# Patient Record
Sex: FEMALE | Race: WHITE | NOT HISPANIC OR LATINO | Employment: OTHER | URBAN - METROPOLITAN AREA
[De-identification: names, ages, dates, MRNs, and addresses within clinical notes are randomized per-mention and may not be internally consistent; named-entity substitution may affect disease eponyms.]

---

## 2017-01-23 ENCOUNTER — TELEPHONE (OUTPATIENT)
Dept: MEDICAL GROUP | Facility: CLINIC | Age: 77
End: 2017-01-23

## 2017-01-23 DIAGNOSIS — Z13.89 SKIN CONDITION SCREENING: ICD-10-CM

## 2017-02-01 ENCOUNTER — OFFICE VISIT (OUTPATIENT)
Dept: MEDICAL GROUP | Facility: CLINIC | Age: 77
End: 2017-02-01
Payer: MEDICARE

## 2017-02-01 VITALS
BODY MASS INDEX: 25.19 KG/M2 | RESPIRATION RATE: 18 BRPM | DIASTOLIC BLOOD PRESSURE: 88 MMHG | TEMPERATURE: 98.2 F | SYSTOLIC BLOOD PRESSURE: 128 MMHG | HEIGHT: 61 IN | WEIGHT: 133.4 LBS | HEART RATE: 98 BPM | OXYGEN SATURATION: 97 %

## 2017-02-01 DIAGNOSIS — Z12.31 ENCOUNTER FOR SCREENING MAMMOGRAM FOR MALIGNANT NEOPLASM OF BREAST: ICD-10-CM

## 2017-02-01 DIAGNOSIS — L98.9 SKIN LESION: ICD-10-CM

## 2017-02-01 DIAGNOSIS — Z23 NEED FOR PNEUMOCOCCAL VACCINATION: ICD-10-CM

## 2017-02-01 PROCEDURE — G8420 CALC BMI NORM PARAMETERS: HCPCS | Performed by: PHYSICIAN ASSISTANT

## 2017-02-01 PROCEDURE — 3017F COLORECTAL CA SCREEN DOC REV: CPT | Performed by: PHYSICIAN ASSISTANT

## 2017-02-01 PROCEDURE — 3014F SCREEN MAMMO DOC REV: CPT | Performed by: PHYSICIAN ASSISTANT

## 2017-02-01 PROCEDURE — 90670 PCV13 VACCINE IM: CPT | Performed by: PHYSICIAN ASSISTANT

## 2017-02-01 PROCEDURE — G0009 ADMIN PNEUMOCOCCAL VACCINE: HCPCS | Performed by: PHYSICIAN ASSISTANT

## 2017-02-01 PROCEDURE — 4040F PNEUMOC VAC/ADMIN/RCVD: CPT | Performed by: PHYSICIAN ASSISTANT

## 2017-02-01 PROCEDURE — 99213 OFFICE O/P EST LOW 20 MIN: CPT | Mod: 25 | Performed by: PHYSICIAN ASSISTANT

## 2017-02-01 PROCEDURE — 1101F PT FALLS ASSESS-DOCD LE1/YR: CPT | Performed by: PHYSICIAN ASSISTANT

## 2017-02-01 PROCEDURE — G8482 FLU IMMUNIZE ORDER/ADMIN: HCPCS | Performed by: PHYSICIAN ASSISTANT

## 2017-02-01 PROCEDURE — G8432 DEP SCR NOT DOC, RNG: HCPCS | Performed by: PHYSICIAN ASSISTANT

## 2017-02-01 PROCEDURE — 1036F TOBACCO NON-USER: CPT | Performed by: PHYSICIAN ASSISTANT

## 2017-02-01 NOTE — MR AVS SNAPSHOT
"        Janet Hummel   2017 9:45 AM   Office Visit   MRN: [id number]    Department:  [facility]   Dept Phone:  [phone]    Description:  Female : 1940   Provider:  Dalila Black PA-C           Reason for Visit     Other \" spot popped up near neck last month and wants to have it checked, seems to be changing in appearance\"       Allergies as of 2017     Allergen Noted Reactions    Nkda [No Known Drug Allergy] 2009         You were diagnosed with     Encounter for screening mammogram for malignant neoplasm of breast   [622882]       Skin lesion   [185774]       Need for pneumococcal vaccination   [324832]         Vital Signs     Blood Pressure Pulse Temperature Respirations Height Weight    128/88 mmHg 98 36.8 °C (98.2 °F) 18 1.549 m (5' 1\") 60.51 kg (133 lb 6.4 oz)    Body Mass Index Oxygen Saturation Last Menstrual Period Smoking Status          25.22 kg/m2 97% 1996 Never Smoker         Basic Information     Date Of Birth Sex Race Ethnicity Preferred Language    1940 Female White Non- English      Your appointments     Mar 13, 2017  9:20 AM   Established Patient with Amy Salgado M.D.   [facility]    [address] [id number] [phone]           You will be receiving a confirmation call a few days before your appointment from our automated call confirmation system.            2017 10:40 AM   Established Patient with Amy Salgado M.D.   [facility]    [address] [place] [id number] [phone]           You will be receiving a confirmation call a few days before your appointment from our automated call confirmation system.              Problem List              ICD-10-CM Priority Class Noted - Resolved    Ringing in ears H93.19   2009 - Present    OA (osteoarthritis) M19.90   10/26/2011 - Present    Allergic rhinitis " J30.9   10/26/2011 - Present    Osteopenia M85.80   9/25/2013 - Present    Hyperglycemia R73.9   10/14/2015 - Present      Health Maintenance        Date Due Completion Dates    IMM DTaP/Tdap/Td Vaccine (1 - Tdap) 3/22/1961 ---    IMM ZOSTER VACCINE 3/22/2002 ---    IMM PNEUMOCOCCAL 65+ (ADULT) LOW/MEDIUM RISK SERIES (1 of 2 - PCV13) 3/22/2007 ---    MAMMOGRAM 7/16/2015 7/16/2014, 7/15/2013, 7/11/2012, 6/13/2011, 6/7/2010, 6/7/2010, 6/4/2009, 6/4/2009, 6/3/2008, 6/3/2008, 5/29/2007, 5/29/2007, 4/26/2006    COLONOSCOPY 4/25/2019 4/25/2016    BONE DENSITY 11/12/2019 11/12/2014, 7/11/2012, 7/13/2010, 5/24/2006            Current Immunizations     13-VALENT PCV PREVNAR 2/1/2017 10:10 AM    Influenza Vaccine Adult HD 10/28/2016      Below and/or attached are the medications your provider expects you to take. Review all of your home medications and newly ordered medications with your provider and/or pharmacist. Follow medication instructions as directed by your provider and/or pharmacist. Please keep your medication list with you and share with your provider. Update the information when medications are discontinued, doses are changed, or new medications (including over-the-counter products) are added; and carry medication information at all times in the event of emergency situations     Allergies:  NKDA - (reactions not documented)               Medications  Valid as of: February 01, 2017 - 10:17 AM    Generic Name Brand Name Tablet Size Instructions for use    Glucosamine-Chondroitin   Take 2 Tabs by mouth every morning.        Multiple Vitamins-Minerals   Take  by mouth.        Omega-3 Fatty Acids (Cap) fish oil 1000 MG Take 1,000 mg by mouth 3 times a day, with meals.        .                 Medicines prescribed today were sent to:     [id number] - [place], [place] - [address] [address]    Phone: [phone] Fax: [phone]    Open 24 Hours?: No      Medication refill  instructions:       If your prescription bottle indicates you have medication refills left, it is not necessary to call your provider’s office. Please contact your pharmacy and they will refill your medication.    If your prescription bottle indicates you do not have any refills left, you may request refills at any time through one of the following ways: The online CompareMyFare system (except Urgent Care), by calling your provider’s office, or by asking your pharmacy to contact your provider’s office with a refill request. Medication refills are processed only during regular business hours and may not be available until the next business day. Your provider may request additional information or to have a follow-up visit with you prior to refilling your medication.   *Please Note: Medication refills are assigned a new Rx number when refilled electronically. Your pharmacy may indicate that no refills were authorized even though a new prescription for the same medication is available at the pharmacy. Please request the medicine by name with the pharmacy before contacting your provider for a refill.        Referral     A referral request has been sent to our patient care coordination department. Please allow 3-5 business days for us to process this request and contact you either by phone or mail. If you do not hear from us by the 5th business day, please call us at [phone].           CompareMyFare Status: Patient Declined

## 2017-02-01 NOTE — PROGRESS NOTES
"Chief Complaint   Patient presents with   • Other     \" spot popped up near neck last month and wants to have it checked, seems to be changing in appearance\"        HPI  Janet Hummel is a 76 y.o. female here today for new onset skin lesion over the chest X 1 mons. Pt noticed a raised fleshy color bump over the chest. Is not sure if it is changing in color or growing in size. States that because of the location her cloths rub on it which has made it red and irritated. denies any bleeding or pruritis has a derm referral however her appointment is not until July.   Denies any Hx of skin cancer.        Past medical, surgical, family, and social history is reviewed in Epic chart by me today.   Medications and allergies reviewed and updated in Epic chart by me today.     ROS:   As documented in history of present illness above    Exam:  Blood pressure 128/88, pulse 98, temperature 36.8 °C (98.2 °F), resp. rate 18, height 1.549 m (5' 1\"), weight 60.51 kg (133 lb 6.4 oz), last menstrual period 01/01/1996, SpO2 97 %.  Constitutional: Alert, no distress, plus 3 vital signs  Skin:  Warm, dry, there is a erythematous raised skin lesion over mid upper chest with some scaling on top. No blood vessels or telangiectasia over the lesion.  Respiratory: lungs clear to auscultation, no wheezes, no rhonchi  Cardiovascular: RRR, no murmur,   MS: obvious kyphosis of thoracic spine  Psych: Alert, pleasant, well-groomed, normal affect    A/P:  1. Encounter for screening mammogram for malignant neoplasm of breast    - MA-SCREEN MAMMO W/CAD-BILAT    2. Skin lesion  Lesion looks like it could be either actinic keratosis/ squamous cell carcinoma or an irritated seborrheic keratosis.   - REFERRAL TO DERMATOLOGY to [facility]    3. Need for pneumococcal vaccination  - PNEUMOCOCCAL CONJUGATE VACCINE 13-VALENT    F/u prn       "

## 2017-02-03 ENCOUNTER — TELEPHONE (OUTPATIENT)
Dept: MEDICAL GROUP | Facility: CLINIC | Age: 77
End: 2017-02-03

## 2017-02-03 NOTE — TELEPHONE ENCOUNTER
Pt called stating she called to make her appointment for the dermatology but they said a referral has not been sent but i looked at her chart and its in there.

## 2017-02-19 ENCOUNTER — PATIENT OUTREACH (OUTPATIENT)
Dept: HEALTH INFORMATION MANAGEMENT | Facility: OTHER | Age: 77
End: 2017-02-19

## 2017-02-23 NOTE — PROGRESS NOTES
Attempt #:2     Annual Wellness Visit Scheduling  1. Scheduling Status:Scheduled     Care Gap Scheduling      Health Maintenance Due   Topic Date Due   • IMM DTaP/Tdap/Td Vaccine (1 - Tdap) DECLINED   • IMM ZOSTER VACCINE  Already had, chart updated   • MAMMOGRAM  Already scheduled   • Annual Wellness Visit  SCHEDULED         MyChart Activation: declined

## 2017-03-08 ENCOUNTER — OFFICE VISIT (OUTPATIENT)
Dept: MEDICAL GROUP | Facility: PHYSICIAN GROUP | Age: 77
End: 2017-03-08
Payer: MEDICARE

## 2017-03-08 VITALS
BODY MASS INDEX: 24.29 KG/M2 | HEART RATE: 94 BPM | OXYGEN SATURATION: 94 % | WEIGHT: 132 LBS | DIASTOLIC BLOOD PRESSURE: 62 MMHG | TEMPERATURE: 99.5 F | SYSTOLIC BLOOD PRESSURE: 150 MMHG | HEIGHT: 62 IN

## 2017-03-08 DIAGNOSIS — M85.80 OSTEOPENIA: ICD-10-CM

## 2017-03-08 DIAGNOSIS — Z00.00 MEDICARE ANNUAL WELLNESS VISIT, SUBSEQUENT: ICD-10-CM

## 2017-03-08 DIAGNOSIS — R73.9 HYPERGLYCEMIA: ICD-10-CM

## 2017-03-08 PROCEDURE — G8510 SCR DEP NEG, NO PLAN REQD: HCPCS | Performed by: NURSE PRACTITIONER

## 2017-03-08 PROCEDURE — 1036F TOBACCO NON-USER: CPT | Performed by: NURSE PRACTITIONER

## 2017-03-08 PROCEDURE — G0439 PPPS, SUBSEQ VISIT: HCPCS | Performed by: NURSE PRACTITIONER

## 2017-03-08 RX ORDER — ASCORBIC ACID 500 MG
500 TABLET ORAL DAILY
COMMUNITY

## 2017-03-08 RX ORDER — MAGNESIUM OXIDE 400 MG/1
400 TABLET ORAL DAILY
COMMUNITY

## 2017-03-08 RX ORDER — TURMERIC ROOT EXTRACT 500 MG
TABLET ORAL
COMMUNITY

## 2017-03-08 ASSESSMENT — PATIENT HEALTH QUESTIONNAIRE - PHQ9: CLINICAL INTERPRETATION OF PHQ2 SCORE: 1

## 2017-03-08 NOTE — ASSESSMENT & PLAN NOTE
Chronic condition managed with current medical regimen  Next DEXA 11/2019   Continue with current OTC meds  Followed by Amy Salgado M.D..

## 2017-03-08 NOTE — ASSESSMENT & PLAN NOTE
Chronic condition managed with current medical regimen  Unchanged, cont ringing, does not interfere with hearing  Followed by ENT Dr Bhat.

## 2017-03-08 NOTE — ASSESSMENT & PLAN NOTE
Chronic condition managed with current medical regimen  Stable per review, occ L thumb joint affected   Continue with current meds  Followed by Amy Salgado M.D..

## 2017-03-08 NOTE — ASSESSMENT & PLAN NOTE
Chronic condition managed with current medical regimen  Stable per review, has year round allergies  No meds at this time  Followed by Amy Salgado M.D..

## 2017-03-08 NOTE — PATIENT INSTRUCTIONS
Continue with care through Amy Salgado M.D..  Next Medicare Annual Wellness Visit is due in one year.    Continue care with specialists you are seeing for your chronic problems.    Attached is some preventative information for you to read.          Fall Prevention and Home Safety  Falls cause injuries and can affect all age groups. It is possible to prevent falls.   HOW TO PREVENT FALLS  · Wear shoes with rubber soles that do not have an opening for your toes.   · Keep the inside and outside of your house well lit.   · Use night lights throughout your home.   · Remove clutter from floors.   · Clean up floor spills.   · Remove throw rugs or fasten them to the floor with carpet tape.   · Do not place electrical cords across pathways.   · Put grab bars by your tub, shower, and toilet. Do not use towel bars as grab bars.   · Put handrails on both sides of the stairway. Fix loose handrails.   · Do not climb on stools or stepladders, if possible.   · Do not wax your floors.   · Repair uneven or unsafe sidewalks, walkways, or stairs.   · Keep items you use a lot within reach.   · Be aware of pets.   · Keep emergency numbers next to the telephone.   · Put smoke detectors in your home and near bedrooms.   Ask your doctor what other things you can do to prevent falls.  Document Released: 10/14/2010 Document Revised: 06/18/2013 Document Reviewed: 03/19/2013  ExitCare® Patient Information ©2013 [employer].    Exercise to Stay Healthy      Exercise helps you become and stay healthy.  EXERCISE IDEAS AND TIPS  Choose exercises that:  · You enjoy.   · Fit into your day.   You do not need to exercise really hard to be healthy. You can do exercises at a slow or medium level and stay healthy. You can:  · Stretch before and after working out.   · Try yoga, Pilates, or marli chi.   · Lift weights.   · Walk fast, swim, jog, run, climb stairs, bicycle, dance, or rollerskate.   · Take aerobic classes.   Exercises that burn about 150  calories:  · Running 1 ½ miles in 15 minutes.   · Playing volleyball for 45 to 60 minutes.   · Washing and waxing a car for 45 to 60 minutes.   · Playing touch football for 45 minutes.   · Walking 1 ¾ miles in 35 minutes.   · Pushing a stroller 1 ½ miles in 30 minutes.   · Playing basketball for 30 minutes.   · Raking leaves for 30 minutes.   · Bicycling 5 miles in 30 minutes.   · Walking 2 miles in 30 minutes.   · Dancing for 30 minutes.   · Shoveling snow for 15 minutes.   · Swimming laps for 20 minutes.   · Walking up stairs for 15 minutes.   · Bicycling 4 miles in 15 minutes.   · Gardening for 30 to 45 minutes.   · Jumping rope for 15 minutes.   · Washing windows or floors for 45 to 60 minutes.     One suggestion is to start walking for 10 minutes after each meal.  This will help with digestion and help you to metabolize your meal.       For Weight Loss -   Recommend portion sizes with more fruits/vegetables/high fiber foods.  Stay away from white bread/pastas/white rice/white potatoes.  Increase Fluid intake to 6-8 glasses of water daily.   Eliminate soda's (diet and regular) from your fluid intake.      Document Released: 01/20/2012 Document Revised: 03/11/2013 Document Reviewed: 01/20/2012  ExitCare® Patient Information ©2013 [employer].    Fat and Cholesterol Control Diet  Cholesterol is a wax-like substance. It comes from your liver and is found in certain foods. There is good (HDL) and bad (LDL) cholesterol. Too much cholesterol in your blood can affect your heart. Certain foods can lower or raise your cholesterol. Eat foods that are low in cholesterol.  Saturated and trans fats are bad fats found in foods that will raise your cholesterol. Do not eat foods that are high in saturated and trans fats.  FOODS HIGHER IN SATURATED AND TRANS FATS  · Dairy products, such as whole milk, eggs, cheese, cream, and butter.   · Fatty meats, such as hot dogs, sausage, and salami.   · Fried foods.   · Trans fats which  are found in margarine and pre-made cookies, crackers, and baked goods.   · Tropical oils, such as coconut and palm oils.   Read package labels at the store. Do not buy products that use saturated or trans fats or hydrogenated oils. Find foods labeled:  · Low-fat.   · Low-saturated fat.   · Trans-fat-free.   · Low-cholesterol.   FOODS LOWER IN CHOLESTEROL  ·  Fruit.   · Vegetables.   · Beans, peas, and lentils.   · Fish.   · Lean meat, such as chicken (without skin) or ground turkey.   · Grains, such as barley, rice, couscous, bulgur wheat, and pasta.   · Heart-healthy tub margarine.   PREPARING YOUR FOOD  · Broil, bake, steam, or roast foods. Do not duarte food.   · Use non-stick cooking sprays.   · Use lemon or herbs to flavor food instead of using butter or stick margarine.   · Use nonfat yogurt, salsa, or low-fat dressings for salads.   Document Released: 06/18/2013 Document Reviewed: 06/18/2013  ExitCare® Patient Information ©2013 [employer].    Recommend annual flu vaccine.  Next due in Fall, 2015.  If you decide not to have the flu vaccine, recommend good handwashing and staying out of crowds during flu season.

## 2017-03-08 NOTE — MR AVS SNAPSHOT
"        Janet Hummel   3/8/2017 1:00 PM   Office Visit   MRN: [id number]    Department:  [facility]   Dept Phone:  [phone]    Description:  Female : 1940   Provider:  RONNIE Olsen           Reason for Visit     Annual Exam subsequent      Allergies as of 3/8/2017     Allergen Noted Reactions    Nkda [No Known Drug Allergy] 2009         You were diagnosed with     Medicare annual wellness visit, subsequent   [711235]       Osteopenia   [638613]       Hyperglycemia   [047432]         Vital Signs     Blood Pressure Pulse Temperature Height Weight Body Mass Index    150/62 mmHg 94 37.5 °C (99.5 °F) 1.562 m (5' 1.5\") 59.875 kg (132 lb) 24.54 kg/m2    Oxygen Saturation Last Menstrual Period Smoking Status             94% 1996 Never Smoker          Basic Information     Date Of Birth Sex Race Ethnicity Preferred Language    1940 Female White Non- English      Your appointments     Mar 13, 2017  9:20 AM   Established Patient with Amy Salgado M.D.   [facility])    [address] [address] [phone]           You will be receiving a confirmation call a few days before your appointment from our automated call confirmation system.            Mar 17, 2017  8:40 AM   MERYL FISCHERN10 with RB MG 1   [facility] ([address])    [address]  [facility] [address] [phone]           No deodorant, powder, perfume or lotion under the arm or breast area.            2017 10:40 AM   Established Patient with Amy Salgado M.D.   [facility])    [address] [address] [phone]           You will be receiving a confirmation call a few days before your appointment from our automated call confirmation system.              Problem List              ICD-10-CM Priority Class Noted - Resolved    Ringing in ears " H93.19   5/28/2009 - Present    OA (osteoarthritis) M19.90   10/26/2011 - Present    Allergic rhinitis J30.9   10/26/2011 - Present    Osteopenia M85.80   9/25/2013 - Present    Hyperglycemia R73.9   10/14/2015 - Present      Health Maintenance        Date Due Completion Dates    IMM DTaP/Tdap/Td Vaccine (1 - Tdap) 3/22/1961 ---    MAMMOGRAM 7/16/2015 7/16/2014, 7/15/2013, 7/11/2012, 6/13/2011, 6/7/2010, 6/7/2010, 6/4/2009, 6/4/2009, 6/3/2008, 6/3/2008, 5/29/2007, 5/29/2007, 4/26/2006    IMM PNEUMOCOCCAL 65+ (ADULT) LOW/MEDIUM RISK SERIES (2 of 2 - PPSV23) 2/1/2018 2/1/2017    COLONOSCOPY 4/25/2019 4/25/2016    BONE DENSITY 11/12/2019 11/12/2014, 7/11/2012, 7/13/2010, 5/24/2006            Current Immunizations     13-VALENT PCV PREVNAR 2/1/2017 10:10 AM    Influenza Vaccine Adult HD 10/28/2016    SHINGLES VACCINE 11/5/2014      Below and/or attached are the medications your provider expects you to take. Review all of your home medications and newly ordered medications with your provider and/or pharmacist. Follow medication instructions as directed by your provider and/or pharmacist. Please keep your medication list with you and share with your provider. Update the information when medications are discontinued, doses are changed, or new medications (including over-the-counter products) are added; and carry medication information at all times in the event of emergency situations     Allergies:  NKDA - (reactions not documented)               Medications  Valid as of: March 08, 2017 -  3:21 PM    Generic Name Brand Name Tablet Size Instructions for use    Ascorbic Acid (Tab) ascorbic acid 500 MG Take 500 mg by mouth every day.        B Complex Vitamins   Take  by mouth.        Cholecalciferol (Tab) Cholecalciferol 66678 UNITS Take  by mouth.        Glucosamine-Chondroitin   Take 2 Tabs by mouth every morning.        Magnesium Oxide (Tab) MAG- MG Take 400 mg by mouth every day.        Menaquinone-7   Take  by  mouth.        Omega-3 Fatty Acids (Cap) fish oil 1000 MG Take 1,000 mg by mouth 3 times a day, with meals.        Turmeric (Tab) Turmeric 500 MG Take  by mouth.        .                 Medicines prescribed today were sent to:     [id number], [place] - [address] [address]    Phone: [phone] Fax: [phone]    Open 24 Hours?: No      Medication refill instructions:       If your prescription bottle indicates you have medication refills left, it is not necessary to call your provider’s office. Please contact your pharmacy and they will refill your medication.    If your prescription bottle indicates you do not have any refills left, you may request refills at any time through one of the following ways: The online [employer] system (except Urgent Care), by calling your provider’s office, or by asking your pharmacy to contact your provider’s office with a refill request. Medication refills are processed only during regular business hours and may not be available until the next business day. Your provider may request additional information or to have a follow-up visit with you prior to refilling your medication.   *Please Note: Medication refills are assigned a new Rx number when refilled electronically. Your pharmacy may indicate that no refills were authorized even though a new prescription for the same medication is available at the pharmacy. Please request the medicine by name with the pharmacy before contacting your provider for a refill.        Instructions    Continue with care through Amy Salgado M.D..  Next Medicare Annual Wellness Visit is due in one year.    Continue care with specialists you are seeing for your chronic problems.    Attached is some preventative information for you to read.          Fall Prevention and Home Safety  Falls cause injuries and can affect all age groups. It is possible to prevent falls.   HOW TO PREVENT FALLS  · Wear  shoes with rubber soles that do not have an opening for your toes.   · Keep the inside and outside of your house well lit.   · Use night lights throughout your home.   · Remove clutter from floors.   · Clean up floor spills.   · Remove throw rugs or fasten them to the floor with carpet tape.   · Do not place electrical cords across pathways.   · Put grab bars by your tub, shower, and toilet. Do not use towel bars as grab bars.   · Put handrails on both sides of the stairway. Fix loose handrails.   · Do not climb on stools or stepladders, if possible.   · Do not wax your floors.   · Repair uneven or unsafe sidewalks, walkways, or stairs.   · Keep items you use a lot within reach.   · Be aware of pets.   · Keep emergency numbers next to the telephone.   · Put smoke detectors in your home and near bedrooms.   Ask your doctor what other things you can do to prevent falls.  Document Released: 10/14/2010 Document Revised: 06/18/2013 Document Reviewed: 03/19/2013  ExitCare® Patient Information ©2013 [employer].    Exercise to Stay Healthy      Exercise helps you become and stay healthy.  EXERCISE IDEAS AND TIPS  Choose exercises that:  · You enjoy.   · Fit into your day.   You do not need to exercise really hard to be healthy. You can do exercises at a slow or medium level and stay healthy. You can:  · Stretch before and after working out.   · Try yoga, Pilates, or marli chi.   · Lift weights.   · Walk fast, swim, jog, run, climb stairs, bicycle, dance, or rollerskate.   · Take aerobic classes.   Exercises that burn about 150 calories:  · Running 1 ½ miles in 15 minutes.   · Playing volleyball for 45 to 60 minutes.   · Washing and waxing a car for 45 to 60 minutes.   · Playing touch football for 45 minutes.   · Walking 1 ¾ miles in 35 minutes.   · Pushing a stroller 1 ½ miles in 30 minutes.   · Playing basketball for 30 minutes.   · Raking leaves for 30 minutes.   · Bicycling 5 miles in 30 minutes.   · Walking 2 miles in  30 minutes.   · Dancing for 30 minutes.   · Shoveling snow for 15 minutes.   · Swimming laps for 20 minutes.   · Walking up stairs for 15 minutes.   · Bicycling 4 miles in 15 minutes.   · Gardening for 30 to 45 minutes.   · Jumping rope for 15 minutes.   · Washing windows or floors for 45 to 60 minutes.     One suggestion is to start walking for 10 minutes after each meal.  This will help with digestion and help you to metabolize your meal.       For Weight Loss -   Recommend portion sizes with more fruits/vegetables/high fiber foods.  Stay away from white bread/pastas/white rice/white potatoes.  Increase Fluid intake to 6-8 glasses of water daily.   Eliminate soda's (diet and regular) from your fluid intake.      Document Released: 01/20/2012 Document Revised: 03/11/2013 Document Reviewed: 01/20/2012  ExitCare® Patient Information ©2013 [employer].    Fat and Cholesterol Control Diet  Cholesterol is a wax-like substance. It comes from your liver and is found in certain foods. There is good (HDL) and bad (LDL) cholesterol. Too much cholesterol in your blood can affect your heart. Certain foods can lower or raise your cholesterol. Eat foods that are low in cholesterol.  Saturated and trans fats are bad fats found in foods that will raise your cholesterol. Do not eat foods that are high in saturated and trans fats.  FOODS HIGHER IN SATURATED AND TRANS FATS  · Dairy products, such as whole milk, eggs, cheese, cream, and butter.   · Fatty meats, such as hot dogs, sausage, and salami.   · Fried foods.   · Trans fats which are found in margarine and pre-made cookies, crackers, and baked goods.   · Tropical oils, such as coconut and palm oils.   Read package labels at the store. Do not buy products that use saturated or trans fats or hydrogenated oils. Find foods labeled:  · Low-fat.   · Low-saturated fat.   · Trans-fat-free.   · Low-cholesterol.   FOODS LOWER IN CHOLESTEROL  ·  Fruit.   · Vegetables.   · Beans, peas,  and lentils.   · Fish.   · Lean meat, such as chicken (without skin) or ground turkey.   · Grains, such as barley, rice, couscous, bulgur wheat, and pasta.   · Heart-healthy tub margarine.   PREPARING YOUR FOOD  · Broil, bake, steam, or roast foods. Do not duarte food.   · Use non-stick cooking sprays.   · Use lemon or herbs to flavor food instead of using butter or stick margarine.   · Use nonfat yogurt, salsa, or low-fat dressings for salads.   Document Released: 06/18/2013 Document Reviewed: 06/18/2013  ExitCare® Patient Information ©2013 [employer].    Recommend annual flu vaccine.  Next due in Fall, 2015.  If you decide not to have the flu vaccine, recommend good handwashing and staying out of crowds during flu season.                  MyChart Status: Patient Declined

## 2017-03-08 NOTE — PROGRESS NOTES
CC:   Medicare Annual Wellness Visit    HPI:  Janet is a 76 y.o. here for Medicare Annual Wellness Visit    Patient Active Problem List    Diagnosis Date Noted   • Hyperglycemia 10/14/2015   • Osteopenia 09/25/2013   • OA (osteoarthritis) 10/26/2011   • Allergic rhinitis 10/26/2011   • Ringing in ears 05/28/2009     Current Outpatient Prescriptions   Medication Sig Dispense Refill   • ascorbic acid (ASCORBIC ACID) 500 MG Tab Take 500 mg by mouth every day.     • magnesium oxide (MAG-OX) 400 MG Tab Take 400 mg by mouth every day.     • B Complex Vitamins (B COMPLEX PO) Take  by mouth.     • Cholecalciferol 13912 UNITS Tab Take  by mouth.     • Menaquinone-7 (VITAMIN K2 PO) Take  by mouth.     • Turmeric 500 MG Tab Take  by mouth.     • Omega-3 Fatty Acids (FISH OIL) 1000 MG CAPS capsule Take 1,000 mg by mouth 3 times a day, with meals.     • GLUCOSAMINE CHONDRO COMPLEX PO Take 2 Tabs by mouth every morning.       No current facility-administered medications for this visit.      Current supplements: no  Chronic narcotic pain medicines: no  Allergies: Nkda  Exercise: as quinn  Current social contact/activities: Has support of family and friends      Screening:  Depression Screening    Little interest or pleasure in doing things?     Feeling down, depressed , or hopeless?    Trouble falling or staying asleep, or sleeping too much?     Feeling tired or having little energy?     Poor appetite or overeating?     Feeling bad about yourself - or that you are a failure or have let yourself or your family down?    Trouble concentrating on things, such as reading the newspaper or watching television?    Moving or speaking so slowly that other people could have noticed.  Or the opposite - being so fidgety or restless that you have been moving around a lot more than usual?     Thoughts that you would be better off dead, or of hurting yourself?     Patient Health Questionnaire Score:      If depressive symptoms identified  deferred to follow up visit unless specifically addressed in assesment and plan.      Screening for Cognitive Impairment    Three Minute Recall (banana, sunrise, fence)   /3    Draw clock face with all 12 numbers set to the hand to show 10 minures past 11 o'clock       Cognitive concerns identified defferred for follow up unless specifically addressed in assesment and plan.    Fall Risk Assessment    Has the patient had two or more falls in the last year or any fall with injury in the last year?       Safety Assessment    Throw rugs on floor.     Handrails on all stairs.     Good lighting in all hallways.     Difficulty hearing.     Patient counseled about all safety risks that were identified.    Functional Assessment ADLs    Are there any barriers preventing you from cooking for yourself or meeting nutritional needs?   .    Are there any barriers preventing you from driving safely or obtaining transportation?   .    Are there any barriers preventing you from using a telephone or calling for help?   .    Are there any barriers preventing you from shopping?   .    Are there any barriers preventing you from taking care of your own finances?   .    Are there any barriers preventing you from managing your medications?   .    Are currently engaing any exercise or physical activity?   .       Health Maintenance Summary                IMM DTaP/Tdap/Td Vaccine Overdue 3/22/1961     MAMMOGRAM Overdue 7/16/2015      Done 7/16/2014 MA-SCREENING MAMMOGRAM W/ CAD     Patient has more history with this topic...    Annual Wellness Visit Overdue 7/23/2016      Done 7/23/2015      Patient has more history with this topic...    IMM PNEUMOCOCCAL 65+ (ADULT) LOW/MEDIUM RISK SERIES Next Due 2/1/2018      Done 2/1/2017 Imm Admin: Pneumococcal Conjugate Vaccine (Prevnar/PCV-13)    COLONOSCOPY Next Due 4/25/2019      Done 4/25/2016 AMB REFERRAL TO GI FOR COLONOSCOPY    BONE DENSITY Next Due 11/12/2019      Done 11/12/2014 DS-BONE DENSITY  "STUDY (DEXA)     Patient has more history with this topic...          Patient Care Team:  Amy Salgado M.D. as PCP - General  Bipin Menjivar M.D. as Consulting Physician (Ophthalmology)  Yohan Pandey D.C. as Consulting Physician (Chiropractic)  Matt Winslow M.D. as Consulting Physician (Allergy)  Brant Fernandez M.D. as Consulting Physician (Orthopaedics)  Tani Solorzano M.D. as Consulting Physician (Gastroenterology)  Yohan Sims M.D. as Consulting Physician (Otolaryngology)  César Mancini D.P.M. as Consulting Physician (Podiatry)        Social History   Substance Use Topics   • Smoking status: Never Smoker    • Smokeless tobacco: Never Used   • Alcohol Use: No       Family History   Problem Relation Age of Onset   • Thyroid Mother      She  has a past medical history of Allergy; Osteoporosis; Arthritis; CATARACT; and Right shoulder pain (8/12/2013).   Past Surgical History   Procedure Laterality Date   • Tonsillectomy     • Cataract extraction with iol     • Colonoscopy     • Umbilical hernia repair  9/17/2009     Performed by JUDITH LOZA at SURGERY [facility]   • Low anterior resection laparoscopic  9/4/2012     Performed by TANI SRINIVASAN at SURGERY [facility]S       ROS:    Ostomy or other tubes or amputations: no  Chronic oxygen use no  Last eye exam 2016  : Denies incontinence.   Gait: Uses no assistive device   Hearing good.    Dentition good    Exam: Blood pressure 150/62, pulse 94, temperature 37.5 °C (99.5 °F), height 1.562 m (5' 1.5\"), weight 59.875 kg (132 lb), last menstrual period 01/01/1996, SpO2 94 %. Body mass index is 24.54 kg/(m^2).  Alert, oriented in no acute distress.  Eye contact is good, speech goal directed, affect calm      Assessment and Plan. The following treatment and monitoring plan is recommended for all problems as listed below:   OA (osteoarthritis)  Chronic condition managed with current medical regimen  Stable per review, occ L thumb " joint affected   Continue with current meds  Followed by Amy Salgado M.D..        Allergic rhinitis  Chronic condition managed with current medical regimen  Stable per review, has year round allergies  No meds at this time  Followed by Amy Salgado M.D..        Osteopenia  Chronic condition managed with current medical regimen  Next DEXA 11/2019   Continue with current OTC meds  Followed by Amy Salgado M.D..        Hyperglycemia  Last A1C in chart 10/2015 6.4  Diet managed  Followed by Amy Salgado M.D..      Ringing in Ears  Chronic condition managed with current medical regimen  Unchanged, cont ringing, does not interfere with hearing  Followed by ENT Dr Bhat.            Health Care Screening recommendations reviewed with patient today: per Patient Instructions  DPA/Advanced directive: .has an advanced directive - a copy has been provided    Next office visit for recheck of chronic medical conditions is due with Amy Salgado M.D. 3/13/2017        Has an appt for mammo 3/17/2017

## 2017-03-13 ENCOUNTER — OFFICE VISIT (OUTPATIENT)
Dept: MEDICAL GROUP | Facility: CLINIC | Age: 77
End: 2017-03-13
Payer: MEDICARE

## 2017-03-13 VITALS
HEART RATE: 78 BPM | TEMPERATURE: 98.4 F | HEIGHT: 61 IN | RESPIRATION RATE: 16 BRPM | WEIGHT: 129 LBS | DIASTOLIC BLOOD PRESSURE: 70 MMHG | OXYGEN SATURATION: 97 % | BODY MASS INDEX: 24.35 KG/M2 | SYSTOLIC BLOOD PRESSURE: 130 MMHG

## 2017-03-13 DIAGNOSIS — R73.9 HYPERGLYCEMIA: ICD-10-CM

## 2017-03-13 DIAGNOSIS — M85.80 OSTEOPENIA: ICD-10-CM

## 2017-03-13 DIAGNOSIS — M15.9 OSTEOARTHRITIS OF MULTIPLE JOINTS, UNSPECIFIED OSTEOARTHRITIS TYPE: ICD-10-CM

## 2017-03-13 DIAGNOSIS — J30.1 SEASONAL ALLERGIC RHINITIS DUE TO POLLEN: ICD-10-CM

## 2017-03-13 PROCEDURE — 99213 OFFICE O/P EST LOW 20 MIN: CPT | Performed by: INTERNAL MEDICINE

## 2017-03-13 PROCEDURE — G8420 CALC BMI NORM PARAMETERS: HCPCS | Performed by: INTERNAL MEDICINE

## 2017-03-13 PROCEDURE — G8482 FLU IMMUNIZE ORDER/ADMIN: HCPCS | Performed by: INTERNAL MEDICINE

## 2017-03-13 PROCEDURE — 3017F COLORECTAL CA SCREEN DOC REV: CPT | Performed by: INTERNAL MEDICINE

## 2017-03-13 PROCEDURE — 3014F SCREEN MAMMO DOC REV: CPT | Performed by: INTERNAL MEDICINE

## 2017-03-13 PROCEDURE — 1036F TOBACCO NON-USER: CPT | Performed by: INTERNAL MEDICINE

## 2017-03-13 PROCEDURE — 1101F PT FALLS ASSESS-DOCD LE1/YR: CPT | Performed by: INTERNAL MEDICINE

## 2017-03-13 PROCEDURE — G8432 DEP SCR NOT DOC, RNG: HCPCS | Performed by: INTERNAL MEDICINE

## 2017-03-13 PROCEDURE — 4040F PNEUMOC VAC/ADMIN/RCVD: CPT | Performed by: INTERNAL MEDICINE

## 2017-03-13 NOTE — PROGRESS NOTES
"Subjective:  Janet is a 76 y.o. female with the following   Past Medical History   Diagnosis Date   • Allergy    • Osteoporosis    • Arthritis      thumb   • CATARACT      IOL beatris   • Right shoulder pain 8/12/2013      Family History   Problem Relation Age of Onset   • Thyroid Mother      The patient is on the following medications:   Current outpatient prescriptions:   •  Cholecalciferol (VITAMIN D3) 5000 UNITS Cap, Take 1 Cap by mouth every day., Disp: , Rfl:   •  ascorbic acid (ASCORBIC ACID) 500 MG Tab, Take 500 mg by mouth every day., Disp: , Rfl:   •  magnesium oxide (MAG-OX) 400 MG Tab, Take 400 mg by mouth every day., Disp: , Rfl:   •  B Complex Vitamins (B COMPLEX PO), Take  by mouth., Disp: , Rfl:   •  Menaquinone-7 (VITAMIN K2 PO), Take  by mouth., Disp: , Rfl:   •  Turmeric 500 MG Tab, Take  by mouth., Disp: , Rfl:   •  Omega-3 Fatty Acids (FISH OIL) 1000 MG CAPS capsule, Take 1,000 mg by mouth 3 times a day, with meals., Disp: , Rfl:   •  GLUCOSAMINE CHONDRO COMPLEX PO, Take 2 Tabs by mouth every morning., Disp: , Rfl:     HPI; patient is here today for follow-up visit, currently on vitamin D3 supplements for history of osteopenia with vitamin D deficiency, on over-the-counter glucosamine-chondroitin and Tylenol as needed for osteoarthritis denies having joint swelling or pain, abdominal pain or jaundice. Patient has had history of hyperglycemia denies having polyuria or polydipsia.  ROS:  See HPI    Blood pressure 130/70, pulse 78, temperature 36.9 °C (98.4 °F), resp. rate 16, height 1.562 m (5' 1.5\"), weight 58.514 kg (129 lb), last menstrual period 01/01/1996, SpO2 97 %.on RA  Objective:  Patient is well appearing and in no acute distress.  Pharynx is clear.  Neck is soft and supple with no cervical or supraclavicular lymphadenopathy, thyromegaly or masses, no JVD.  Lungs clear to auscultation bilaterally with normal respiratory effort. Abdomen soft, non-tender on palpation,not distended. Heart " regular rate and rhythm without murmur. Extremities without any clubbing, cyanosis, or edema.    Assessment and Plan:  1. osteopenia ; currently  on vitamin D  supplements, magnesium, ....       2. Hyperglycemia. Currently is asymptomatic, labs are pending      3. allergic rhinitis; responds to OTC nasal sprays as needed       4. Osteoarthritis; currently on OTC glucosamine -chondroitin, Tylenol as needed.       Patient is advised regarding preventive and supportive care, diet and lifestyle modification, daily walking ,exercise and weight loss.    Please note that this dictation was created using voice recognition software. I have worked with consultants from the vendor as well as technical experts from [employer] to optimize the interface. I have made every reasonable attempt to correct obvious errors, but I expect that there are errors of grammar and possibly content that I did not discover before finalizing the note.Please note that this dictation was created using voice recognition software. I have worked with consultants from the vendor as well as technical experts from [employer] to optimize the interface. I have made every reasonable attempt to correct obvious errors, but I expect that there are errors of grammar and possibly content that I did not discover before finalizing the note.

## 2017-03-13 NOTE — MR AVS SNAPSHOT
"Janet Hummel   3/13/2017 9:20 AM   Office Visit   MRN: [id number]    Department:  [facility]   Dept Phone:  [phone]    Description:  Female : 1940   Provider:  Amy Salgado M.D.           Allergies as of 3/13/2017     Allergen Noted Reactions    Nkda [No Known Drug Allergy] 2009         You were diagnosed with     Osteopenia   [206609]       Hyperglycemia   [375208]         Vital Signs     Blood Pressure Pulse Temperature Respirations Height Weight    130/70 mmHg 78 36.9 °C (98.4 °F) 16 1.562 m (5' 1.5\") 58.514 kg (129 lb)    Body Mass Index Oxygen Saturation Last Menstrual Period Smoking Status          23.98 kg/m2 97% 1996 Never Smoker         Basic Information     Date Of Birth Sex Race Ethnicity Preferred Language    1940 Female White Non- English      Your appointments     Mar 17, 2017  8:40 AM   MA SCRN10 with [facility] MG 1   [facility] BREAST [facility] ([address])    [address] [place] [id number]   [phone]           No deodorant, powder, perfume or lotion under the arm or breast area.              Problem List              ICD-10-CM Priority Class Noted - Resolved    Ringing in ears H93.19   2009 - Present    OA (osteoarthritis) M19.90   10/26/2011 - Present    Allergic rhinitis J30.9   10/26/2011 - Present    Osteopenia M85.80   2013 - Present    Hyperglycemia R73.9   10/14/2015 - Present      Health Maintenance        Date Due Completion Dates    IMM DTaP/Tdap/Td Vaccine (1 - Tdap) 3/22/1961 ---    MAMMOGRAM 2015, 7/15/2013, 2012, 2011, 2010, 2010, 2009, 2009, 6/3/2008, 6/3/2008, 2007, 2007, 2006    IMM PNEUMOCOCCAL 65+ (ADULT) LOW/MEDIUM RISK SERIES (2 of 2 - PPSV23) 2018    COLONOSCOPY 2019    BONE DENSITY 2019, 2012, 2010, 2006            Current Immunizations     13-VALENT PCV PREVNAR " 2/1/2017 10:10 AM    Influenza Vaccine Adult HD 10/28/2016    SHINGLES VACCINE 11/5/2014      Below and/or attached are the medications your provider expects you to take. Review all of your home medications and newly ordered medications with your provider and/or pharmacist. Follow medication instructions as directed by your provider and/or pharmacist. Please keep your medication list with you and share with your provider. Update the information when medications are discontinued, doses are changed, or new medications (including over-the-counter products) are added; and carry medication information at all times in the event of emergency situations     Allergies:  NKDA - (reactions not documented)               Medications  Valid as of: March 13, 2017 -  9:37 AM    Generic Name Brand Name Tablet Size Instructions for use    Ascorbic Acid (Tab) ascorbic acid 500 MG Take 500 mg by mouth every day.        B Complex Vitamins   Take  by mouth.        Cholecalciferol (Cap) vitamin D3 5000 UNITS Take 1 Cap by mouth every day.        Glucosamine-Chondroitin   Take 2 Tabs by mouth every morning.        Magnesium Oxide (Tab) MAG- MG Take 400 mg by mouth every day.        Menaquinone-7   Take  by mouth.        Omega-3 Fatty Acids (Cap) fish oil 1000 MG Take 1,000 mg by mouth 3 times a day, with meals.        Turmeric (Tab) Turmeric 500 MG Take  by mouth.        .                 Medicines prescribed today were sent to:     [facility] # [address] [place] [address]    Phone: [phone] Fax: [phone]    Open 24 Hours?: No      Medication refill instructions:       If your prescription bottle indicates you have medication refills left, it is not necessary to call your provider’s office. Please contact your pharmacy and they will refill your medication.    If your prescription bottle indicates you do not have any refills left, you may request refills at any time  through one of the following ways: The online UseTogether system (except Urgent Care), by calling your provider’s office, or by asking your pharmacy to contact your provider’s office with a refill request. Medication refills are processed only during regular business hours and may not be available until the next business day. Your provider may request additional information or to have a follow-up visit with you prior to refilling your medication.   *Please Note: Medication refills are assigned a new Rx number when refilled electronically. Your pharmacy may indicate that no refills were authorized even though a new prescription for the same medication is available at the pharmacy. Please request the medicine by name with the pharmacy before contacting your provider for a refill.        Your To Do List     Future Labs/Procedures Complete By Expires    CREATININE  As directed 3/13/2018    HEMOGLOBIN A1C  As directed 3/13/2018    VITAMIN D,25 HYDROXY  As directed 3/13/2018         UseTogether Status: Patient Declined

## 2017-03-17 ENCOUNTER — TELEPHONE (OUTPATIENT)
Dept: MEDICAL GROUP | Facility: CLINIC | Age: 77
End: 2017-03-17

## 2017-03-17 ENCOUNTER — HOSPITAL ENCOUNTER (OUTPATIENT)
Dept: RADIOLOGY | Facility: MEDICAL CENTER | Age: 77
End: 2017-03-17
Attending: PHYSICIAN ASSISTANT
Payer: MEDICARE

## 2017-03-17 PROCEDURE — 77063 BREAST TOMOSYNTHESIS BI: CPT

## 2017-03-17 NOTE — TELEPHONE ENCOUNTER
----- Message from Dalila Black PA-C sent at 3/17/2017  2:18 PM PDT -----  Please inform pt:    Mammogram result was normal, follow up mammogram is recommended in one year.      Thank you,  Dalila Black PA-C

## 2017-06-09 ENCOUNTER — HOSPITAL ENCOUNTER (OUTPATIENT)
Dept: LAB | Facility: MEDICAL CENTER | Age: 77
End: 2017-06-09
Attending: INTERNAL MEDICINE
Payer: MEDICARE

## 2017-06-09 DIAGNOSIS — M85.80 OSTEOPENIA: ICD-10-CM

## 2017-06-09 DIAGNOSIS — R73.9 HYPERGLYCEMIA: ICD-10-CM

## 2017-06-09 LAB
25(OH)D3 SERPL-MCNC: 65 NG/ML (ref 30–100)
CREAT SERPL-MCNC: 0.74 MG/DL (ref 0.5–1.4)
EST. AVERAGE GLUCOSE BLD GHB EST-MCNC: 128 MG/DL
GFR SERPL CREATININE-BSD FRML MDRD: >60 ML/MIN/1.73 M 2
HBA1C MFR BLD: 6.1 % (ref 0–5.6)

## 2017-06-09 PROCEDURE — 83036 HEMOGLOBIN GLYCOSYLATED A1C: CPT | Mod: GA

## 2017-06-09 PROCEDURE — 82565 ASSAY OF CREATININE: CPT

## 2017-06-09 PROCEDURE — 82306 VITAMIN D 25 HYDROXY: CPT

## 2017-06-09 PROCEDURE — 36415 COLL VENOUS BLD VENIPUNCTURE: CPT

## 2017-07-05 ENCOUNTER — TELEPHONE (OUTPATIENT)
Dept: MEDICAL GROUP | Facility: PHYSICIAN GROUP | Age: 77
End: 2017-07-05

## 2017-07-17 ENCOUNTER — OFFICE VISIT (OUTPATIENT)
Dept: MEDICAL GROUP | Facility: PHYSICIAN GROUP | Age: 77
End: 2017-07-17
Payer: MEDICARE

## 2017-07-17 VITALS
HEART RATE: 101 BPM | RESPIRATION RATE: 18 BRPM | BODY MASS INDEX: 23.98 KG/M2 | DIASTOLIC BLOOD PRESSURE: 66 MMHG | SYSTOLIC BLOOD PRESSURE: 140 MMHG | TEMPERATURE: 98.8 F | HEIGHT: 61 IN | WEIGHT: 127 LBS | OXYGEN SATURATION: 95 %

## 2017-07-17 DIAGNOSIS — J30.1 SEASONAL ALLERGIC RHINITIS DUE TO POLLEN: ICD-10-CM

## 2017-07-17 DIAGNOSIS — R73.9 HYPERGLYCEMIA: ICD-10-CM

## 2017-07-17 DIAGNOSIS — M15.9 OSTEOARTHRITIS OF MULTIPLE JOINTS, UNSPECIFIED OSTEOARTHRITIS TYPE: ICD-10-CM

## 2017-07-17 DIAGNOSIS — M85.89 OSTEOPENIA OF MULTIPLE SITES: ICD-10-CM

## 2017-07-17 PROCEDURE — 99213 OFFICE O/P EST LOW 20 MIN: CPT | Performed by: INTERNAL MEDICINE

## 2017-07-17 NOTE — MR AVS SNAPSHOT
"        Janet Hummel   2017 2:00 PM   Office Visit   MRN: [id number]    Department:  [facility]   Dept Phone:  [phone]    Description:  Female : 1940   Provider:  Amy Salgado M.D.           Reason for Visit     Follow-Up lab results      Allergies as of 2017     Allergen Noted Reactions    Nkda [No Known Drug Allergy] 2009         You were diagnosed with     Osteopenia of multiple sites   [9853196]       Hyperglycemia   [829643]       Osteoarthritis of multiple joints, unspecified osteoarthritis type   [1880897]       Seasonal allergic rhinitis due to pollen   [7023571]         Vital Signs     Blood Pressure Pulse Temperature Respirations Height Weight    140/66 mmHg 101 37.1 °C (98.8 °F) 18 1.562 m (5' 1.5\") 57.607 kg (127 lb)    Body Mass Index Oxygen Saturation Last Menstrual Period Smoking Status          23.61 kg/m2 95% 1996 Never Smoker         Basic Information     Date Of Birth Sex Race Ethnicity Preferred Language    1940 Female White Non- English      Problem List              ICD-10-CM Priority Class Noted - Resolved    Ringing in ears H93.19   2009 - Present    OA (osteoarthritis) M19.90   10/26/2011 - Present    Allergic rhinitis J30.9   10/26/2011 - Present    Osteopenia M85.80   2013 - Present    Hyperglycemia R73.9   10/14/2015 - Present      Health Maintenance        Date Due Completion Dates    IMM DTaP/Tdap/Td Vaccine (1 - Tdap) 3/22/1961 ---    IMM INFLUENZA (1) 2017 10/28/2016    IMM PNEUMOCOCCAL 65+ (ADULT) LOW/MEDIUM RISK SERIES (2 of 2 - PPSV23) 2018    MAMMOGRAM 3/17/2018 3/17/2017, 2014, 7/15/2013, 2012, 2011, 2010, 2010, 2009, 2009, 6/3/2008, 6/3/2008, 2007, 2007, 2006    COLONOSCOPY 2019    BONE DENSITY 2019, 2012, 2010, 2006            Current Immunizations     13-VALENT PCV PREVNAR 2017 10:10 AM   " Influenza Vaccine Adult HD 10/28/2016    SHINGLES VACCINE 11/5/2014      Below and/or attached are the medications your provider expects you to take. Review all of your home medications and newly ordered medications with your provider and/or pharmacist. Follow medication instructions as directed by your provider and/or pharmacist. Please keep your medication list with you and share with your provider. Update the information when medications are discontinued, doses are changed, or new medications (including over-the-counter products) are added; and carry medication information at all times in the event of emergency situations     Allergies:  NKDA - (reactions not documented)               Medications  Valid as of: July 17, 2017 -  3:14 PM    Generic Name Brand Name Tablet Size Instructions for use    Ascorbic Acid (Tab) ascorbic acid 500 MG Take 500 mg by mouth every day.        B Complex Vitamins   Take  by mouth.        Cholecalciferol (Cap) vitamin D3 5000 UNITS Take 1 Cap by mouth every day.        Glucosamine-Chondroitin   Take 2 Tabs by mouth every morning.        Magnesium Oxide (Tab) MAG- MG Take 400 mg by mouth every day.        Menaquinone-7   Take  by mouth.        Omega-3 Fatty Acids (Cap) fish oil 1000 MG Take 1,000 mg by mouth 3 times a day, with meals.        Turmeric (Tab) Turmeric 500 MG Take  by mouth.        .                 Medicines prescribed today were sent to:     [facility] # [address] [place] [address] [address] [place] [address]    Phone: [phone] Fax: [phone]    Open 24 Hours?: No      Medication refill instructions:       If your prescription bottle indicates you have medication refills left, it is not necessary to call your provider’s office. Please contact your pharmacy and they will refill your medication.    If your prescription bottle indicates you do not have any refills left, you may request refills at any time through one of the  following ways: The online 1010data system (except Urgent Care), by calling your provider’s office, or by asking your pharmacy to contact your provider’s office with a refill request. Medication refills are processed only during regular business hours and may not be available until the next business day. Your provider may request additional information or to have a follow-up visit with you prior to refilling your medication.   *Please Note: Medication refills are assigned a new Rx number when refilled electronically. Your pharmacy may indicate that no refills were authorized even though a new prescription for the same medication is available at the pharmacy. Please request the medicine by name with the pharmacy before contacting your provider for a refill.        Your To Do List     Future Labs/Procedures Complete By Expires    HBA1C  1/13/2018 7/17/2018    VITAMIN D,25 HYDROXY  As directed 7/17/2018      Instructions    Patient is instructed regarding acute on chronic issues.          1010data Access Code: [email or web address]  Expires: 8/16/2017  3:14 PM    1010data  A secure, online tool to manage your health information     [employer]’s 1010data® is a secure, online tool that connects you to your personalized health information from the privacy of your home -- day or night - making it very easy for you to manage your healthcare. Once the activation process is completed, you can even access your medical information using the 1010data leesa, which is available for free in the Apple Leesa store or Google Play store.     1010data provides the following levels of access (as shown below):   My Chart Features   [facility] Doctor [facility]  Specialists [facility]  Urgent  Care Non-[facility]  Primary Care  Doctor   Email your healthcare team securely and privately 24/7 X X X    Manage appointments: schedule your next appointment; view details of past/upcoming appointments X      Request prescription refills. X      View recent  personal medical records, including lab and immunizations X X X X   View health record, including health history, allergies, medications X X X X   Read reports about your outpatient visits, procedures, consult and ER notes X X X X   See your discharge summary, which is a recap of your hospital and/or ER visit that includes your diagnosis, lab results, and care plan. X X       How to register for Eday[employer]:  1. Go to  [email or web address].  2. Click on the Sign Up Now box, which takes you to the New Member Sign Up page. You will need to provide the following information:  a. Enter your Edaytown Access Code exactly as it appears at the top of this page. (You will not need to use this code after you’ve completed the sign-up process. If you do not sign up before the expiration date, you must request a new code.)   b. Enter your date of birth.   c. Enter your home email address.   d. Click Submit, and follow the next screen’s instructions.  3. Create a Edaytown ID. This will be your Edaytown login ID and cannot be changed, so think of one that is secure and easy to remember.  4. Create a Edaytown password. You can change your password at any time.  5. Enter your Password Reset Question and Answer. This can be used at a later time if you forget your password.   6. Enter your e-mail address. This allows you to receive e-mail notifications when new information is available in Edaytown.  7. Click Sign Up. You can now view your health information.    For assistance activating your Edaytown account, call [phone]

## 2017-07-17 NOTE — PROGRESS NOTES
"Subjective:  Janet is a 77 y.o. female with the following   Past Medical History   Diagnosis Date   • Allergy    • Osteoporosis    • Arthritis      thumb   • CATARACT      IOL beatris   • Right shoulder pain 8/12/2013      Family History   Problem Relation Age of Onset   • Thyroid Mother      The patient is on the following medications:   Current outpatient prescriptions:   •  Cholecalciferol (VITAMIN D3) 5000 UNITS Cap, Take 1 Cap by mouth every day., Disp: , Rfl:   •  ascorbic acid (ASCORBIC ACID) 500 MG Tab, Take 500 mg by mouth every day., Disp: , Rfl:   •  magnesium oxide (MAG-OX) 400 MG Tab, Take 400 mg by mouth every day., Disp: , Rfl:   •  B Complex Vitamins (B COMPLEX PO), Take  by mouth., Disp: , Rfl:   •  Menaquinone-7 (VITAMIN K2 PO), Take  by mouth., Disp: , Rfl:   •  Turmeric 500 MG Tab, Take  by mouth., Disp: , Rfl:   •  Omega-3 Fatty Acids (FISH OIL) 1000 MG CAPS capsule, Take 1,000 mg by mouth 3 times a day, with meals., Disp: , Rfl:   •  GLUCOSAMINE CHONDRO COMPLEX PO, Take 2 Tabs by mouth every morning., Disp: , Rfl:     HPI; Patient is here today for follow-up visit regarding her recent labs, currently on oral supplements for osteopenia denies having back pain , also on oral supplements for osteoarthritis complaining of localized joint swelling or pain. Patient has modified her diet cutting back on sugar intake, denies having polyuria or polydipsia.            ROS:  See HPI    Blood pressure 140/66, pulse 101, temperature 37.1 °C (98.8 °F), resp. rate 18, height 1.562 m (5' 1.5\"), weight 57.607 kg (127 lb), last menstrual period 01/01/1996, SpO2 95 %.on RA  Objective:  Patient is well appearing and in no acute distress.  Pharynx is clear.  Neck is soft and supple with no cervical or supraclavicular lymphadenopathy, thyromegaly or masses, no JVD.  Lungs clear to auscultation bilaterally with normal respiratory effort. Abdomen soft, non-tender on palpation,not distended. Heart regular rate and " rhythm without murmur. Extremities without any clubbing, cyanosis, or edema.    Assessment and Plan:  1. osteopenia ; currently  on vitamin D  supplements, magnesium, ....      Ref. Range 9/24/2009 10:00 6/9/2017 10:25   25-Hydroxy   Vitamin D 25 Latest Ref Range:  ng/mL 57 65     2. Hyperglycemia. Currently is asymptomatic;        Ref. Range 10/15/2015 09:50 6/9/2017 10:25   Glycohemoglobin Latest Ref Range: 0.0-5.6 % 6.4 (H) 6.1 (H)      Ref. Range 10/15/2015 09:50 6/9/2017 10:25   Creatinine Latest Ref Range: 0.50-1.40 mg/dL 0.68 0.74   GFR If  Latest Ref Range: >60 mL/min/1.73 m 2 >60 >60   GFR If Non  Latest Ref Range: >60 mL/min/1.73 m 2 >60 >60     3. allergic rhinitis; responds to OTC nasal sprays as needed       4. Osteoarthritis; currently on OTC glucosamine -chondroitin, Tylenol as needed.       5.preventive health ;     Screening mammogram;   FINDINGS:     The breasts are heterogeneously dense, which may obscure small masses.  There is no dominant mass, suspicious calcification, or any secondary malignant sign.     IMPRESSION:        1.  Breasts are heterogeneously dense, which may obscure small masses. No gross evidence of malignancy and no interval change.     2.  Screening mammogram in one year is recommended.     Patient is advised regarding preventive and supportive care, diet and lifestyle modification, daily walking ,exercise and weight loss.  Please note that this dictation was created using voice recognition software. I have worked with consultants from the vendor as well as technical experts from [facility] [employer] to optimize the interface. I have made every reasonable attempt to correct obvious errors, but I expect that there are errors of grammar and possibly content that I did not discover before finalizing the note.

## 2017-09-27 ENCOUNTER — OFFICE VISIT (OUTPATIENT)
Dept: URGENT CARE | Facility: PHYSICIAN GROUP | Age: 77
End: 2017-09-27
Payer: MEDICARE

## 2017-09-27 VITALS
DIASTOLIC BLOOD PRESSURE: 74 MMHG | HEIGHT: 61 IN | WEIGHT: 128 LBS | SYSTOLIC BLOOD PRESSURE: 152 MMHG | HEART RATE: 104 BPM | OXYGEN SATURATION: 95 % | BODY MASS INDEX: 24.17 KG/M2 | TEMPERATURE: 99.7 F

## 2017-09-27 DIAGNOSIS — S89.92XA LEFT KNEE INJURY, INITIAL ENCOUNTER: ICD-10-CM

## 2017-09-27 PROCEDURE — 99214 OFFICE O/P EST MOD 30 MIN: CPT | Performed by: PHYSICIAN ASSISTANT

## 2017-09-27 ASSESSMENT — PAIN SCALES - GENERAL: PAINLEVEL: 8=MODERATE-SEVERE PAIN

## 2017-09-28 ASSESSMENT — ENCOUNTER SYMPTOMS
MUSCULOSKELETAL NEGATIVE: 1
WEAKNESS: 0
FEVER: 0
DIZZINESS: 0
CHILLS: 0
HEADACHES: 0
ABDOMINAL PAIN: 0
NUMBNESS: 0
SHORTNESS OF BREATH: 0
DIARRHEA: 0
VOMITING: 0
NAUSEA: 0

## 2017-09-28 NOTE — PROGRESS NOTES
"Subjective:      Janet Hummel is a 77 y.o. female who presents with Knee Pain (Knee pain Left - x2 weeks. Felt sharp pain  said he noticed swelling in back of knee )        Patient is accompanied by her daughter.     Knee Pain   This is a new problem. The current episode started 1 to 4 weeks ago (2 weeks). The problem occurs intermittently. The problem has been unchanged. Pertinent negatives include no abdominal pain, chest pain, chills, fever, headaches, nausea, numbness, vomiting or weakness. She has tried rest for the symptoms. The treatment provided mild relief.     Patient presents to urgent care reporting left knee pain that started 2 weeks ago while she was at the grocery store. She states she suddenly felt a sharp, shooting pain down the back of her left knee while walking. She is unsure if she twisted or bent over in a certain way that could have caused the pain. Since the original incident, she has had similar episodes intermittently. She has not been able to determine any specific movements or triggers that cause this pain. No history of a recent fall or traumatic injury to the knee. No history of knee injuries or surgeries.     Review of Systems   Constitutional: Negative for chills and fever.   Respiratory: Negative for shortness of breath.    Cardiovascular: Negative for chest pain.   Gastrointestinal: Negative for abdominal pain, diarrhea, nausea and vomiting.   Genitourinary: Negative.    Musculoskeletal: Negative.         Positive for knee pain   Neurological: Negative for dizziness, weakness, numbness and headaches.          Objective:     /74   Pulse (!) 104   Temp 37.6 °C (99.7 °F)   Ht 1.549 m (5' 1\")   Wt 58.1 kg (128 lb)   LMP 01/01/1996   SpO2 95%   Breastfeeding? No   BMI 24.19 kg/m²      Physical Exam   Constitutional: She is oriented to person, place, and time. She appears well-developed and well-nourished. No distress.   HENT:   Head: Normocephalic and " atraumatic.   Eyes: Pupils are equal, round, and reactive to light.   Neck: Normal range of motion.   Cardiovascular: Normal rate.    Pulmonary/Chest: Effort normal.   Musculoskeletal: Normal range of motion.        Left knee: She exhibits normal range of motion, no swelling, no effusion, no ecchymosis, no deformity, no LCL laxity and no MCL laxity. No tenderness found. No medial joint line and no lateral joint line tenderness noted.   Normal gait   Neurological: She is alert and oriented to person, place, and time.   Skin: Skin is warm and dry. She is not diaphoretic.   Psychiatric: She has a normal mood and affect. Her behavior is normal.   Nursing note and vitals reviewed.          PMH:  has a past medical history of Allergy; Arthritis; CATARACT; Osteoporosis; and Right shoulder pain (8/12/2013).  MEDS:   Current Outpatient Prescriptions:   •  Cholecalciferol (VITAMIN D3) 5000 UNITS Cap, Take 1 Cap by mouth every day., Disp: , Rfl:   •  ascorbic acid (ASCORBIC ACID) 500 MG Tab, Take 500 mg by mouth every day., Disp: , Rfl:   •  magnesium oxide (MAG-OX) 400 MG Tab, Take 400 mg by mouth every day., Disp: , Rfl:   •  B Complex Vitamins (B COMPLEX PO), Take  by mouth., Disp: , Rfl:   •  Menaquinone-7 (VITAMIN K2 PO), Take  by mouth., Disp: , Rfl:   •  Turmeric 500 MG Tab, Take  by mouth., Disp: , Rfl:   •  Omega-3 Fatty Acids (FISH OIL) 1000 MG CAPS capsule, Take 1,000 mg by mouth 3 times a day, with meals., Disp: , Rfl:   •  GLUCOSAMINE CHONDRO COMPLEX PO, Take 2 Tabs by mouth every morning., Disp: , Rfl:   ALLERGIES:   Allergies   Allergen Reactions   • Nkda [No Known Drug Allergy]      SURGHX:   Past Surgical History:   Procedure Laterality Date   • LOW ANTERIOR RESECTION LAPAROSCOPIC  9/4/2012    Performed by REGINO SRINIVASAN at SURGERY [facility] ORS   • UMBILICAL HERNIA REPAIR  9/17/2009    Performed by JUDITH LOZA at SURGERY [facility] ORS   • CATARACT EXTRACTION WITH IOL     • COLONOSCOPY     •  TONSILLECTOMY       SOCHX:  reports that she has never smoked. She has never used smokeless tobacco. She reports that she does not drink alcohol or use drugs.  FH: family history includes Thyroid in her mother.       Assessment/Plan:     1. Left knee injury, initial encounter    - REFERRAL TO SPORTS MEDICINE    Exam largely unremarkable at today's visit. Discussed the possibility of Baker's cyst causing patient's symptoms. Encouraged wearing a compressive knee brace, icing, and taking OTC nsaids as needed for 1-2 weeks. She will follow up with sports medicine if her symptoms fail to improve/resolve. The patient demonstrated a good understanding and agreed with the treatment plan.

## 2017-10-02 ENCOUNTER — APPOINTMENT (OUTPATIENT)
Dept: RADIOLOGY | Facility: IMAGING CENTER | Age: 77
End: 2017-10-02
Attending: FAMILY MEDICINE
Payer: MEDICARE

## 2017-10-02 ENCOUNTER — OFFICE VISIT (OUTPATIENT)
Dept: MEDICAL GROUP | Facility: CLINIC | Age: 77
End: 2017-10-02
Payer: MEDICARE

## 2017-10-02 VITALS
HEIGHT: 61 IN | SYSTOLIC BLOOD PRESSURE: 116 MMHG | HEART RATE: 90 BPM | RESPIRATION RATE: 18 BRPM | DIASTOLIC BLOOD PRESSURE: 70 MMHG | BODY MASS INDEX: 24.17 KG/M2 | OXYGEN SATURATION: 97 % | TEMPERATURE: 97.9 F | WEIGHT: 128 LBS

## 2017-10-02 DIAGNOSIS — M25.562 ACUTE PAIN OF LEFT KNEE: ICD-10-CM

## 2017-10-02 DIAGNOSIS — M17.11 PRIMARY OSTEOARTHRITIS OF RIGHT KNEE: ICD-10-CM

## 2017-10-02 PROCEDURE — 73564 X-RAY EXAM KNEE 4 OR MORE: CPT | Mod: TC,LT | Performed by: FAMILY MEDICINE

## 2017-10-02 PROCEDURE — 99203 OFFICE O/P NEW LOW 30 MIN: CPT | Performed by: FAMILY MEDICINE

## 2017-10-02 NOTE — PROGRESS NOTES
CHIEF COMPLAINT:  Janet Hummel female presenting at the request of Glenna Vargas PA-C for evaluation of knee pain.     Janet Hummel is complaining of left knee pain  present for 3 weeks  Pain is at the posterior knee  Quality is sharp  Pain is non-radiating   Improved with resting  Aggravated by standing, walking  no prior problems with this area in the past   Prior Treatments: NONE  Prior studies: NO Prior imaging has been done   Medications tried for pain include: ibuprofen (OTC)  Mechanical Symptom history: No Locking and Popping    REVIEW OF SYSTEMS  No Nausea, No Vomiting, No Chest Pain, No Shortness of Breath, No Dizziness, No Headache      PAST MEDICAL HISTORY:   History reviewed. No pertinent past medical history.    PMH:  has a past medical history of Allergy; Arthritis; CATARACT; Osteoporosis; and Right shoulder pain (8/12/2013).  MEDS:   Current Outpatient Prescriptions:   •  Cholecalciferol (VITAMIN D3) 5000 UNITS Cap, Take 1 Cap by mouth every day., Disp: , Rfl:   •  ascorbic acid (ASCORBIC ACID) 500 MG Tab, Take 500 mg by mouth every day., Disp: , Rfl:   •  magnesium oxide (MAG-OX) 400 MG Tab, Take 400 mg by mouth every day., Disp: , Rfl:   •  B Complex Vitamins (B COMPLEX PO), Take  by mouth., Disp: , Rfl:   •  Menaquinone-7 (VITAMIN K2 PO), Take  by mouth., Disp: , Rfl:   •  Turmeric 500 MG Tab, Take  by mouth., Disp: , Rfl:   •  Omega-3 Fatty Acids (FISH OIL) 1000 MG CAPS capsule, Take 1,000 mg by mouth 3 times a day, with meals., Disp: , Rfl:   •  GLUCOSAMINE CHONDRO COMPLEX PO, Take 2 Tabs by mouth every morning., Disp: , Rfl:   ALLERGIES:   Allergies   Allergen Reactions   • Nkda [No Known Drug Allergy]      SURGHX:   Past Surgical History:   Procedure Laterality Date   • LOW ANTERIOR RESECTION LAPAROSCOPIC  9/4/2012    Performed by REGINO SRINIVASAN at SURGERY [facility]   • UMBILICAL HERNIA REPAIR  9/17/2009    Performed by JUDITH LOZA at SURGERY [facility]S  "  • CATARACT EXTRACTION WITH IOL     • COLONOSCOPY     • TONSILLECTOMY       SOCHX:  reports that she has never smoked. She has never used smokeless tobacco. She reports that she does not drink alcohol or use drugs.  FH: Family history was reviewed, no pertinent findings to report     PHYSICAL EXAM:  /70   Pulse 90   Temp 36.6 °C (97.9 °F)   Resp 18   Ht 1.549 m (5' 1\")   Wt 58.1 kg (128 lb)   LMP 1996   SpO2 97%   BMI 24.19 kg/m²       well-developed, well-nourished in no apparent distress, alert and oriented x 3.  Gait: normal     RIGHT Knee:  Slight Varus and No Swelling  Range of Motion Intact  Trace effusion  Patellar No tenderness and no apprehension  Medial Joint Line Non-tender and NEGATIVE Siobhan  Lateral Joint Line Non-tender and NEGATIVE Siobhan  Trace Laxity with Varus stress  Trace Laxity with Valgus stress  Lachman's testing is Trace  Posterior Drawer Testing is Trace  The leg is otherwise neurovascularly intact    LEFT Knee:  Slight Varus and No Swelling   Range of Motion Intact  Trace effusion  Patellar No tenderness and no apprehension  Medial Joint Line Tenderness and NEGATIVE Siobhan  Lateral Joint Line Non-tender and NEGATIVE Siobhan  Trace Laxity with Varus stress  Trace Laxity with Valgus stress  Lachman's testing is Trace  Posterior Drawer Testing is Trace  The leg is otherwise neurovascularly intact    Additional Findings: None      1. Acute pain of left knee  DX-KNEE COMPLETE 4+ LEFT   2. Primary osteoarthritis of right knee        Discussed knee osteoarthritis management options includin.  Joint protection, glucosamine/chondroitin and anti-inflammatories   2.  Known ascending activities such as cycling or swimming   3.  Knee bracing  4. Injection options including corticosteroid, viscosupplementation and stem cell injections  5. Surgical options    Continue knee sleeve  Activity as tolerated  Declined corticosteroid injection today, she Plans on taking a trip [place] in the coming weeks, if she changes her mind she can come back for an injection at that time    Return if symptoms worsen or fail to improve.      HISTORY/REASON FOR EXAM:  Atraumatic Pain/Swelling/Deformity  Left knee pain    TECHNIQUE/EXAM DESCRIPTION AND NUMBER OF VIEWS:  4 views of the left knee, 10/2/2017 12:04 PM.    COMPARISON: None    FINDINGS:    The alignment of the knee is within normal limits.  There is no definite joint effusion.  There is no evidence of displaced fracture or dislocation.  There is no focal swelling.         Impression       Unremarkable knee series.                     taken here and reviewed by me, Medial joint space narrowing and enhancement of the tibial spine suggestive of mild to moderate medial compartment osteoarthritis    Thank you Glenna Vargas PA-C for allowing me to participate in caring for your patient.

## 2017-10-06 ENCOUNTER — OFFICE VISIT (OUTPATIENT)
Dept: MEDICAL GROUP | Facility: CLINIC | Age: 77
End: 2017-10-06
Payer: MEDICARE

## 2017-10-06 VITALS
BODY MASS INDEX: 24.17 KG/M2 | DIASTOLIC BLOOD PRESSURE: 90 MMHG | OXYGEN SATURATION: 98 % | TEMPERATURE: 97.2 F | SYSTOLIC BLOOD PRESSURE: 130 MMHG | WEIGHT: 128 LBS | HEIGHT: 61 IN | RESPIRATION RATE: 18 BRPM | HEART RATE: 95 BPM

## 2017-10-06 DIAGNOSIS — M17.11 PRIMARY OSTEOARTHRITIS OF RIGHT KNEE: ICD-10-CM

## 2017-10-06 PROCEDURE — 20610 DRAIN/INJ JOINT/BURSA W/O US: CPT | Performed by: FAMILY MEDICINE

## 2017-10-06 RX ORDER — TRIAMCINOLONE ACETONIDE 40 MG/ML
40 INJECTION, SUSPENSION INTRA-ARTICULAR; INTRAMUSCULAR ONCE
Status: COMPLETED | OUTPATIENT
Start: 2017-10-06 | End: 2017-10-06

## 2017-10-06 RX ADMIN — TRIAMCINOLONE ACETONIDE 40 MG: 40 INJECTION, SUSPENSION INTRA-ARTICULAR; INTRAMUSCULAR at 11:13

## 2017-10-06 NOTE — PROGRESS NOTES
CHIEF COMPLAINT:  Janet Calderon is here for a corticosteroid injection for her LEFT knee pain.     Janet Hummel is complaining of left knee pain  Pain is at the posterior knee  Quality is sharp  Pain is non-radiating   Improved with resting  Aggravated by standing, walking  No mechanical symptoms     LEFT Knee:  Slight Varus and No Swelling   Range of Motion Intact  Trace effusion  Patellar No tenderness and no apprehension  Medial Joint Line Tenderness and NEGATIVE Siobhan  Lateral Joint Line Tenderness and NEGATIVE Siobhan    Additional Findings: None    1. Primary osteoarthritis of right knee         Continue knee sleeve  Activity as tolerated  corticosteroid injection performed today  She Plans on taking a trip to the [place] in the coming weeks    Return in about 4 weeks (around 11/3/2017). after retuning from her trip to the [place]       HISTORY/REASON FOR EXAM:  Atraumatic Pain/Swelling/Deformity  Left knee pain    TECHNIQUE/EXAM DESCRIPTION AND NUMBER OF VIEWS:  4 views of the left knee, 10/2/2017 12:04 PM.    COMPARISON: None    FINDINGS:    The alignment of the knee is within normal limits.  There is no definite joint effusion.  There is no evidence of displaced fracture or dislocation.  There is no focal swelling.         Impression       Unremarkable knee series.                     Medial joint space narrowing and enhancement of the tibial spine suggestive of mild to moderate medial compartment osteoarthritis    Thank you Glenna Vargas PA-C for allowing me to participate in caring for your patient.

## 2018-01-18 ENCOUNTER — NON-PROVIDER VISIT (OUTPATIENT)
Dept: MEDICAL GROUP | Facility: PHYSICIAN GROUP | Age: 78
End: 2018-01-18
Payer: MEDICARE

## 2018-01-18 DIAGNOSIS — Z23 NEED FOR INFLUENZA VACCINATION: ICD-10-CM

## 2018-01-18 PROCEDURE — 90662 IIV NO PRSV INCREASED AG IM: CPT | Performed by: INTERNAL MEDICINE

## 2018-01-18 PROCEDURE — G0008 ADMIN INFLUENZA VIRUS VAC: HCPCS | Performed by: INTERNAL MEDICINE

## 2018-03-28 ENCOUNTER — HOSPITAL ENCOUNTER (OUTPATIENT)
Dept: LAB | Facility: MEDICAL CENTER | Age: 78
End: 2018-03-28
Attending: INTERNAL MEDICINE
Payer: MEDICARE

## 2018-03-28 DIAGNOSIS — M85.89 OSTEOPENIA OF MULTIPLE SITES: ICD-10-CM

## 2018-03-28 DIAGNOSIS — R73.9 HYPERGLYCEMIA: ICD-10-CM

## 2018-03-28 LAB
25(OH)D3 SERPL-MCNC: 61 NG/ML (ref 30–100)
EST. AVERAGE GLUCOSE BLD GHB EST-MCNC: 128 MG/DL
HBA1C MFR BLD: 6.1 % (ref 0–5.6)

## 2018-03-28 PROCEDURE — 82306 VITAMIN D 25 HYDROXY: CPT | Mod: GA

## 2018-03-28 PROCEDURE — 36415 COLL VENOUS BLD VENIPUNCTURE: CPT | Mod: GA

## 2018-03-28 PROCEDURE — 83036 HEMOGLOBIN GLYCOSYLATED A1C: CPT | Mod: GA

## 2018-03-29 ENCOUNTER — TELEPHONE (OUTPATIENT)
Dept: MEDICAL GROUP | Facility: PHYSICIAN GROUP | Age: 78
End: 2018-03-29

## 2018-03-29 NOTE — TELEPHONE ENCOUNTER
----- Message from Caitlyn Patton M.D. sent at 3/29/2018  7:36 AM PDT -----  Please let pt knows that prediabetes is better. Vitamin D is normal. Discuss further with next PCP  Thank you,  Caitlyn Patton M.D.

## 2018-04-04 ENCOUNTER — OFFICE VISIT (OUTPATIENT)
Dept: MEDICAL GROUP | Facility: PHYSICIAN GROUP | Age: 78
End: 2018-04-04
Payer: MEDICARE

## 2018-04-04 VITALS
BODY MASS INDEX: 24.17 KG/M2 | OXYGEN SATURATION: 97 % | SYSTOLIC BLOOD PRESSURE: 138 MMHG | HEART RATE: 83 BPM | RESPIRATION RATE: 14 BRPM | WEIGHT: 128 LBS | HEIGHT: 61 IN | DIASTOLIC BLOOD PRESSURE: 60 MMHG | TEMPERATURE: 98.6 F

## 2018-04-04 DIAGNOSIS — J30.1 CHRONIC SEASONAL ALLERGIC RHINITIS DUE TO POLLEN: ICD-10-CM

## 2018-04-04 DIAGNOSIS — R73.9 HYPERGLYCEMIA: ICD-10-CM

## 2018-04-04 DIAGNOSIS — Z12.39 SCREENING FOR BREAST CANCER: ICD-10-CM

## 2018-04-04 DIAGNOSIS — M85.89 OSTEOPENIA OF MULTIPLE SITES: ICD-10-CM

## 2018-04-04 DIAGNOSIS — M15.9 OSTEOARTHRITIS OF MULTIPLE JOINTS, UNSPECIFIED OSTEOARTHRITIS TYPE: ICD-10-CM

## 2018-04-04 DIAGNOSIS — H93.19 TINNITUS, UNSPECIFIED LATERALITY: ICD-10-CM

## 2018-04-04 PROCEDURE — 99214 OFFICE O/P EST MOD 30 MIN: CPT | Performed by: NURSE PRACTITIONER

## 2018-04-04 RX ORDER — ANTIARTHRITIC COMBINATION NO.2 900 MG
TABLET ORAL
COMMUNITY
End: 2020-03-23

## 2018-04-04 ASSESSMENT — PATIENT HEALTH QUESTIONNAIRE - PHQ9: CLINICAL INTERPRETATION OF PHQ2 SCORE: 0

## 2018-04-04 NOTE — ASSESSMENT & PLAN NOTE
Not currently taking anything for this.  Having some itching on neck and eyes. Has not tried any OTC medication.

## 2018-04-04 NOTE — ASSESSMENT & PLAN NOTE
[facility]  Last dexa scan showed osteopenia. No taking any additional calcium.  Is taking Vitamin D. Does eat yogurt. Reed milk on cereal.

## 2018-04-04 NOTE — PROGRESS NOTES
"Chief Complaint   Patient presents with   • Establish Care   • Itching     under chin        Subjective:   Janet Hummel is a 78 y.o. female here today to establish care and for evaluation and management of:    Osteopenia  Going to [facility].  Last dexa scan showed osteopenia. No taking any additional calcium.  Is taking Vitamin D. Does eat yogurt. [place] milk on cereal.     Ringing in ears  Continues with chronic buzzing sound.  Sees Dr. Sims for this    OA (osteoarthritis)  Mostly in hands.  Uses aspercream as needed.     Allergic rhinitis  Not currently taking anything for this.  Having some itching on neck and eyes. Has not tried any OTC medication.     Hyperglycemia  Last A1c 6.1.  Stable from last year.          Current medicines (including changes today)  Current Outpatient Prescriptions   Medication Sig Dispense Refill   • Biotin 5000 MCG Tab Take  by mouth.     • Cholecalciferol (VITAMIN D3) 5000 UNITS Cap Take 1 Cap by mouth every day.     • ascorbic acid (ASCORBIC ACID) 500 MG Tab Take 500 mg by mouth every day.     • magnesium oxide (MAG-OX) 400 MG Tab Take 400 mg by mouth every day.     • B Complex Vitamins (B COMPLEX PO) Take  by mouth.     • Menaquinone-7 (VITAMIN K2 PO) Take  by mouth.     • Turmeric 500 MG Tab Take  by mouth.     • Omega-3 Fatty Acids (FISH OIL) 1000 MG CAPS capsule Take 1,000 mg by mouth 3 times a day, with meals.     • GLUCOSAMINE CHONDRO COMPLEX PO Take 2 Tabs by mouth every morning.       No current facility-administered medications for this visit.      She  has a past medical history of Allergy; Arthritis; CATARACT; Osteoporosis; and Right shoulder pain (8/12/2013).    ROS as stated in hpi.  Not on any prescription medication  No chest pain, no shortness of breath, no abdominal pain       Objective:     Blood pressure 138/60, pulse 83, temperature 37 °C (98.6 °F), resp. rate 14, height 1.549 m (5' 1\"), weight 58.1 kg (128 lb), last menstrual period 01/01/1996, " SpO2 97 %. Body mass index is 24.19 kg/m².   Physical Exam:  Constitutional: Alert, no distress.  Skin: Warm, dry, good turgor,no cyanosis, no rashes in visible areas.  Eye: Equal, round and reactive, conjunctiva clear, lids normal.  Ears: No tenderness, no discharge.  External canals are without any significant edema or erythema.  Tympanic membranes are without any inflammation, no effusion.  Gross auditory acuity is intact.  Nose: symmetrical without tenderness, no discharge.  Mouth/Throat: lips without lesion.  Oropharynx clear.  Throat without erythema, exudates or tonsillar enlargement.  Neck: Trachea midline, no masses, no obvious thyroid enlargement.. No cervical or supraclavicular lymphadenopathy. Range of motion within normal limits.  Neuro: Cranial nerves 2-12 grossly intact.  No sensory deficit.  Respiratory: Unlabored respiratory effort, lungs clear to auscultation, no wheezes, no ronchi.  Cardiovascular: Normal S1, S2, no murmur, no edema.  Abdomen: Soft, non-tender, no masses, no guarding,  no hepatosplenomegaly.  Psych: Alert and oriented x3, normal affect and mood and judgement.        Assessment and Plan:   The following treatment plan was discussed    1. Osteopenia of multiple sites  This is a new problem to me. Chronic. Ongoing. Stable. Discussed with her adding calcium 1200 mg. She is eating a lot of green vegetables and yogurt. We discussed that that was the recommendation and she could certainly choose. We will repeat DEXA scan next year.monitor and follow.     2. Tinnitus, unspecified laterality  This is a new problem to me. Chronic. Ongoing issue. Stable. Being followed by Dr. Yohan Sims. Monitor. Ear exam negative today.    3. Osteoarthritis of multiple joints, unspecified osteoarthritis type  This is a new problem to me. Chronic. Ongoing issue. Stable. Patient is taking [place] and using Aspercreme as needed. Stable.    4. Chronic seasonal allergic rhinitis due to pollen  This is a  new problem to me. Chronic. Ongoing. Recommended she may try some over-the-counter ranitidine to see if this helps her watery eyes. Monitor and follow.    5. Hyperglycemia  This is a new problem to me. Chronic. Stable. A1c 6.1. Discussed sweets and daily exercise. Monitor and follow. Return in one year.    6. Screening for breast cancer  Patient due for mammogram screening. Order provided. No reported breast changes. Monitor and follow results.  - MA-SCREEN MAMMO W/CAD-BILAT; Future      Followup: Return in about 1 year (around 4/4/2019) for Annual.

## 2018-04-11 ENCOUNTER — HOSPITAL ENCOUNTER (OUTPATIENT)
Dept: RADIOLOGY | Facility: MEDICAL CENTER | Age: 78
End: 2018-04-11
Attending: NURSE PRACTITIONER
Payer: MEDICARE

## 2018-04-11 DIAGNOSIS — Z12.39 SCREENING FOR BREAST CANCER: ICD-10-CM

## 2018-04-11 PROCEDURE — 77063 BREAST TOMOSYNTHESIS BI: CPT

## 2018-04-12 ENCOUNTER — TELEPHONE (OUTPATIENT)
Dept: MEDICAL GROUP | Facility: PHYSICIAN GROUP | Age: 78
End: 2018-04-12

## 2018-04-12 NOTE — TELEPHONE ENCOUNTER
----- Message from RONNIE Blanco sent at 4/11/2018  5:27 PM PDT -----  Please notify patient that her mammogram results have been read.  There was no evidence of malignancy or suspicious areas of concern.   Recommend yearly follow up screening.  RONNIE Blanco

## 2018-04-12 NOTE — LETTER
April 12, 2018         Janet Hummel  [address]  [place] [address]        Dear Janet:      Below are the results from your recent visit:    Please notify patient that her mammogram results have been read.  There was no evidence of malignancy or suspicious areas of concern.   Recommend yearly follow up screening.   RONNIE Blanco     Resulted Orders   MA-MAMMO SCREENING BILAT W/DAVID W/CAD    Narrative    4/11/2018 2:18 PM    HISTORY/REASON FOR EXAM:  Routine Mammographic Screening.      TECHNIQUE/EXAM DESCRIPTION:  Bilateral tomosynthesis screening mammography was performed with standard mammographic images generated from the data set. Images were reviewed and interpreted with CAD.    COMPARISON:   No prior    FINDINGS:    The breasts are heterogeneously dense, which may obscure small masses.  There is no dominant mass, suspicious calcification, or any secondary malignant sign. Benign-appearing calcifications are present.      Impression    1.  Breasts are heterogeneously dense, which may obscure small masses. No gross evidence of malignancy.    2.  Screening mammogram in one year is recommended.      R2 - Category 2:  Benign Finding(s)     If you have any questions or concerns, please don't hesitate to call.    Electronically Signed

## 2018-07-25 ENCOUNTER — PATIENT OUTREACH (OUTPATIENT)
Dept: HEALTH INFORMATION MANAGEMENT | Facility: OTHER | Age: 78
End: 2018-07-25

## 2018-07-25 NOTE — PROGRESS NOTES
Outcome: Left Message    Please transfer to Patient Outreach Team at [phone] when patient returns call.      Attempt # 1  Pre visit planning

## 2018-07-26 NOTE — PROGRESS NOTES
.1. Attempt #:Final    2. HealthConnect Verified: no    3. Verify PCP: yes    4. Review Care Team: yes    5. WebIZ Checked & Epic Updated: Yes  · WebIZ Recommendations: FLU, TDAP and SHINGRIX (Shingles)  · Is patient due for Tdap? YES. Patient was notified of copay/out of pocket cost.  · Is patient due for Shingles? YES. Patient was notified of copay/out of pocket cost.    6. Communication Preference Obtained: yes    7. Annual Wellness Visit Scheduling  · Scheduling Status:Scheduled     8. Care Coordination Enrollment (Attempt to Enroll in Care Coordination)  · Is patient appropriate:   · Is patient interested:      9. Care Gap Scheduling (Attempt to Schedule EACH Overdue Care Gap!)     Health Maintenance Due   Topic Date Due   • IMM DTaP/Tdap/Td Vaccine (1 - Tdap) 03/22/1959   • IMM ZOSTER VACCINES (2 of 3) 12/31/2014   • IMM PNEUMOCOCCAL 65+ (ADULT) LOW/MEDIUM RISK SERIES (2 of 2 - PPSV23) 02/01/2018   • Annual Wellness Visit  03/09/2018        Scheduled patient for Annual Wellness Visit     10. Emergent One Activation: declined    11. Emergent One [place]: no    12. Virtual Visits: no    13. Opt In to Text Messages: no

## 2018-08-03 ENCOUNTER — OFFICE VISIT (OUTPATIENT)
Dept: MEDICAL GROUP | Facility: PHYSICIAN GROUP | Age: 78
End: 2018-08-03
Payer: MEDICARE

## 2018-08-03 VITALS
HEART RATE: 95 BPM | BODY MASS INDEX: 23.22 KG/M2 | OXYGEN SATURATION: 94 % | DIASTOLIC BLOOD PRESSURE: 96 MMHG | SYSTOLIC BLOOD PRESSURE: 140 MMHG | TEMPERATURE: 98.2 F | HEIGHT: 61 IN | WEIGHT: 123 LBS

## 2018-08-03 DIAGNOSIS — Z80.41 FAMILY HISTORY OF OVARIAN CANCER: ICD-10-CM

## 2018-08-03 DIAGNOSIS — R10.2 PELVIC PRESSURE IN FEMALE: ICD-10-CM

## 2018-08-03 DIAGNOSIS — R03.0 ELEVATED BLOOD PRESSURE READING IN OFFICE WITHOUT DIAGNOSIS OF HYPERTENSION: ICD-10-CM

## 2018-08-03 DIAGNOSIS — N81.11 MIDLINE CYSTOCELE: ICD-10-CM

## 2018-08-03 LAB
APPEARANCE UR: CLEAR
BILIRUB UR STRIP-MCNC: NORMAL MG/DL
COLOR UR AUTO: YELLOW
GLUCOSE UR STRIP.AUTO-MCNC: NORMAL MG/DL
KETONES UR STRIP.AUTO-MCNC: NORMAL MG/DL
LEUKOCYTE ESTERASE UR QL STRIP.AUTO: NORMAL
NITRITE UR QL STRIP.AUTO: NORMAL
PH UR STRIP.AUTO: 6 [PH] (ref 5–8)
PROT UR QL STRIP: NORMAL MG/DL
RBC UR QL AUTO: NORMAL
SP GR UR STRIP.AUTO: 1.01
UROBILINOGEN UR STRIP-MCNC: 0.2 MG/DL

## 2018-08-03 PROCEDURE — 99214 OFFICE O/P EST MOD 30 MIN: CPT | Performed by: NURSE PRACTITIONER

## 2018-08-03 PROCEDURE — 81002 URINALYSIS NONAUTO W/O SCOPE: CPT | Performed by: NURSE PRACTITIONER

## 2018-08-03 NOTE — PROGRESS NOTES
"  Subjective:     Chief Complaint   Patient presents with   • Urinary Frequency     with pressure on going       HPI  Janet Hummel is a 78 y.o. female here today for urinary sx. Normally sees MARY Blanco.  For a few years she has intermittent pelvic pressure. Then starting a few weeks ago it worsened. It is now constant. she feels like something is heavy, and loose, causing a pulling pressure sensation in the vagina \"like something is going to fall out.\" Sometimes feels like she has to urinate, but she can't. No frequency, urgency, dysuria, hematuria, or foul odor. No treatments tried. Her sister had ovarian cancer.       Diagnoses of Pelvic pressure in female, Midline cystocele, Family history of ovarian cancer, and Elevated blood pressure reading in office without diagnosis of hypertension were pertinent to this visit.    Allergies: Nkda [no known drug allergy]  Current medicines (including changes today)  Current Outpatient Prescriptions   Medication Sig Dispense Refill   • Biotin 5000 MCG Tab Take  by mouth.     • Cholecalciferol (VITAMIN D3) 5000 UNITS Cap Take 1 Cap by mouth every day.     • ascorbic acid (ASCORBIC ACID) 500 MG Tab Take 500 mg by mouth every day.     • magnesium oxide (MAG-OX) 400 MG Tab Take 400 mg by mouth every day.     • B Complex Vitamins (B COMPLEX PO) Take  by mouth.     • Menaquinone-7 (VITAMIN K2 PO) Take  by mouth.     • Turmeric 500 MG Tab Take  by mouth.     • Omega-3 Fatty Acids (FISH OIL) 1000 MG CAPS capsule Take 1,000 mg by mouth 3 times a day, with meals.     • GLUCOSAMINE CHONDRO COMPLEX PO Take 2 Tabs by mouth every morning.       No current facility-administered medications for this visit.        She  has a past medical history of Allergy; Arthritis; CATARACT; Osteoporosis; and Right shoulder pain (8/12/2013).        ROS  As stated in HPI and additionally  Gen: +recent 5 lb weight loss. No F/C, malaise  GI: No bloating, abd pain, N/V, constipation, " "diarrhea, blood in stool, or early satiety  : see HPI      Objective:     Blood pressure 140/96, pulse 95, temperature 36.8 °C (98.2 °F), height 1.549 m (5' 1\"), weight 55.8 kg (123 lb), last menstrual period 01/01/1996, SpO2 94 %. Body mass index is 23.24 kg/m².  Physical Exam:  General: Alert, oriented, in no acute distress.  Eye contact is good, speech goal directed, affect calm  CNs grossly intact.  HEENT: conjunctiva non-injected, sclera non-icteric, EOMs intact.  Gross hearing intact.  Lungs: unlabored. clear to auscultation bilaterally with good excursion.  CV: regular rate and rhythm  Abdomen: Soft, ND, non-tender. No hepatosplenomegaly. Bowel sounds active.   Pelvic Exam -  Normal external genitalia with no lesions. Slightly atrophic vaginal mucosa with decreased rugation and no discharge. Midline cystocele present (appears mild degree). Cervix with no visible lesions. No cervical motion tenderness. Uterus is normal sized with no masses. No adnexal tenderness or enlargement appreciated.   Rectal: sphincter tone normal, no mass, stool guaiac negative  Ext: no edema, normal color and temperature.   Skin: No rashes or lesions in visible areas  Gait steady.     Assessment and Plan:   Assessment/Plan:  1. Pelvic pressure in female  New problem. Urinalysis without sign of infection or blood. Suspect her symptoms are secondary to cystocele found on examination today. Check ultrasound to ensure no other significant abnormalities and refer to gynecology.  - POCT Urinalysis  - REFERRAL TO GYNECOLOGY  - US-GYN-PELVIS TRANSVAGINAL; Future    2. Midline cystocele  - REFERRAL TO GYNECOLOGY    3. Family history of ovarian cancer  - US-GYN-PELVIS TRANSVAGINAL; Future    4. Elevated blood pressure reading in office without diagnosis of hypertension  Blood pressure was elevated in clinic today, the patient states she was very anxious about this discussion. This should be monitored closely with her PCP as it normally is at " goal       Follow up:  Return if symptoms worsen or fail to improve.    Educated in proper administration of medication(s) ordered today including safety, possible SE, risks, benefits, rationale and alternatives to therapy.   Supportive care, differential diagnoses, and indications for immediate follow-up discussed with patient.    Pathogenesis of diagnosis discussed including typical length and natural progression.    Instructed to return to clinic or nearest emergency department for any change in condition, further concerns, or worsening of symptoms.  Patient states understanding of the plan of care and discharge instructions.      Please note that this dictation was created using voice recognition software. I have made every reasonable attempt to correct obvious errors, but I expect that there are errors of grammar and possibly content that I did not discover before finalizing the note.    Followup: Return if symptoms worsen or fail to improve. sooner should new symptoms or problems arise.

## 2018-08-06 ENCOUNTER — TELEPHONE (OUTPATIENT)
Dept: MEDICAL GROUP | Facility: PHYSICIAN GROUP | Age: 78
End: 2018-08-06

## 2018-08-06 NOTE — TELEPHONE ENCOUNTER
Future Appointments       Provider Department Center    8/15/2018 10:20 AM RONNIE Blanco; [facility]  [facility]    8/20/2018 10:30 AM [place] US 1 IMAGING [facility]        ANNUAL WELLNESS VISIT PRE-VISIT PLANNING WITH OUTREACH    1.  If any orders were placed at last visit, do we have Results/Consult Notes?        •  Labs - Labs ordered, completed on 8/3/18 and results are in chart.       •  Imaging - Imaging ordered, NOT completed. Patient advised to complete prior to next appointment.       •  Referrals - Referral ordered, patient has NOT been seen.    2.  Immunizations were updated in Epic using WebIZ?:Yes       •  WebIZ Recommendations: TDAP and SHINGRIX (Shingles)       •  Is patient due for Tdap? YES. Patient was notified of copay/out of pocket cost.       •  Is patient due for Shingles?YES. Patient was notified of copay/out of pocket cost.    3.  Patient has the following Care Path diagnoses on Problem List:  NONE    4.  MDX printed for Provider? NO MCR    Health Maintenance Due   Topic Date Due   • IMM DTaP/Tdap/Td Vaccine (1 - Tdap) 03/22/1959   • IMM ZOSTER VACCINES (2 of 3) 12/31/2014   • IMM PNEUMOCOCCAL 65+ (ADULT) LOW/MEDIUM RISK SERIES (2 of 2 - PPSV23) 02/01/2018   • Annual Wellness Visit  Scheduled for 8/15/18

## 2018-08-15 ENCOUNTER — OFFICE VISIT (OUTPATIENT)
Dept: MEDICAL GROUP | Facility: PHYSICIAN GROUP | Age: 78
End: 2018-08-15
Payer: MEDICARE

## 2018-08-15 ENCOUNTER — HOSPITAL ENCOUNTER (OUTPATIENT)
Dept: RADIOLOGY | Facility: MEDICAL CENTER | Age: 78
End: 2018-08-15
Attending: NURSE PRACTITIONER
Payer: MEDICARE

## 2018-08-15 VITALS
WEIGHT: 120 LBS | TEMPERATURE: 98.2 F | DIASTOLIC BLOOD PRESSURE: 64 MMHG | HEART RATE: 78 BPM | BODY MASS INDEX: 22.66 KG/M2 | HEIGHT: 61 IN | OXYGEN SATURATION: 96 % | SYSTOLIC BLOOD PRESSURE: 132 MMHG

## 2018-08-15 DIAGNOSIS — Z23 NEED FOR 23-POLYVALENT PNEUMOCOCCAL POLYSACCHARIDE VACCINE: ICD-10-CM

## 2018-08-15 DIAGNOSIS — R10.2 PELVIC PRESSURE IN FEMALE: ICD-10-CM

## 2018-08-15 DIAGNOSIS — Z80.41 FAMILY HISTORY OF OVARIAN CANCER: ICD-10-CM

## 2018-08-15 DIAGNOSIS — H93.19 TINNITUS, UNSPECIFIED LATERALITY: ICD-10-CM

## 2018-08-15 DIAGNOSIS — M85.89 OSTEOPENIA OF MULTIPLE SITES: ICD-10-CM

## 2018-08-15 DIAGNOSIS — H93.13 TINNITUS OF BOTH EARS: ICD-10-CM

## 2018-08-15 DIAGNOSIS — M15.9 OSTEOARTHRITIS OF MULTIPLE JOINTS, UNSPECIFIED OSTEOARTHRITIS TYPE: ICD-10-CM

## 2018-08-15 DIAGNOSIS — R73.9 HYPERGLYCEMIA: ICD-10-CM

## 2018-08-15 DIAGNOSIS — Z00.00 PREVENTATIVE HEALTH CARE: ICD-10-CM

## 2018-08-15 DIAGNOSIS — J30.1 SEASONAL ALLERGIC RHINITIS DUE TO POLLEN: ICD-10-CM

## 2018-08-15 PROCEDURE — 76830 TRANSVAGINAL US NON-OB: CPT

## 2018-08-15 PROCEDURE — 90732 PPSV23 VACC 2 YRS+ SUBQ/IM: CPT | Performed by: NURSE PRACTITIONER

## 2018-08-15 PROCEDURE — G0009 ADMIN PNEUMOCOCCAL VACCINE: HCPCS | Performed by: NURSE PRACTITIONER

## 2018-08-15 PROCEDURE — G0439 PPPS, SUBSEQ VISIT: HCPCS | Mod: 25 | Performed by: NURSE PRACTITIONER

## 2018-08-15 ASSESSMENT — PATIENT HEALTH QUESTIONNAIRE - PHQ9: CLINICAL INTERPRETATION OF PHQ2 SCORE: 0

## 2018-08-15 ASSESSMENT — ACTIVITIES OF DAILY LIVING (ADL): BATHING_REQUIRES_ASSISTANCE: 0

## 2018-08-15 ASSESSMENT — ENCOUNTER SYMPTOMS: GENERAL WELL-BEING: GOOD

## 2018-08-15 NOTE — ASSESSMENT & PLAN NOTE
Goes 3 days/week for stretch and tone class.  Walking,, eating calcium products.  Taking Vitamin D.  Not taking extra calcium

## 2018-08-16 ENCOUNTER — TELEPHONE (OUTPATIENT)
Dept: MEDICAL GROUP | Facility: PHYSICIAN GROUP | Age: 78
End: 2018-08-16

## 2018-08-16 DIAGNOSIS — Z01.419 ENCOUNTER FOR ANNUAL ROUTINE GYNECOLOGICAL EXAMINATION: ICD-10-CM

## 2018-08-16 NOTE — TELEPHONE ENCOUNTER
----- Message from RONNIE Obregon sent at 8/15/2018  5:46 PM PDT -----  Please inform patient that the ultrasound does not show any significant abnormalities. Please request that she follow up with gynecology for management of the cystocele which is likely what is causing the pressure she is experiencing.    RONNIE Obregon

## 2018-08-17 NOTE — TELEPHONE ENCOUNTER
Pt called she stated she called [facility] due to her referral and they inform her they can not see her at the office due to the providers there being booked. I Called to confirm this and it is true can we get another referral place for pt to been seen somewhere else.

## 2018-09-12 ENCOUNTER — HOSPITAL ENCOUNTER (OUTPATIENT)
Dept: LAB | Facility: MEDICAL CENTER | Age: 78
End: 2018-09-12
Attending: OBSTETRICS & GYNECOLOGY
Payer: MEDICARE

## 2018-09-12 ENCOUNTER — HOSPITAL ENCOUNTER (OUTPATIENT)
Dept: LAB | Facility: MEDICAL CENTER | Age: 78
End: 2018-09-12
Attending: NURSE PRACTITIONER
Payer: MEDICARE

## 2018-09-12 DIAGNOSIS — Z00.00 PREVENTATIVE HEALTH CARE: ICD-10-CM

## 2018-09-12 DIAGNOSIS — R73.9 HYPERGLYCEMIA: ICD-10-CM

## 2018-09-12 DIAGNOSIS — M85.89 OSTEOPENIA OF MULTIPLE SITES: ICD-10-CM

## 2018-09-12 LAB
ALBUMIN SERPL BCP-MCNC: 4.3 G/DL (ref 3.2–4.9)
ALBUMIN/GLOB SERPL: 1.7 G/DL
ALP SERPL-CCNC: 69 U/L (ref 30–99)
ALT SERPL-CCNC: 16 U/L (ref 2–50)
ANION GAP SERPL CALC-SCNC: 9 MMOL/L (ref 0–11.9)
AST SERPL-CCNC: 20 U/L (ref 12–45)
BASOPHILS # BLD AUTO: 1.3 % (ref 0–1.8)
BASOPHILS # BLD: 0.06 K/UL (ref 0–0.12)
BILIRUB SERPL-MCNC: 0.9 MG/DL (ref 0.1–1.5)
BUN SERPL-MCNC: 18 MG/DL (ref 8–22)
CALCIUM SERPL-MCNC: 9.2 MG/DL (ref 8.5–10.5)
CANCER AG125 SERPL-ACNC: 13.6 U/ML (ref 0–35)
CHLORIDE SERPL-SCNC: 108 MMOL/L (ref 96–112)
CHOLEST SERPL-MCNC: 221 MG/DL (ref 100–199)
CO2 SERPL-SCNC: 26 MMOL/L (ref 20–33)
CREAT SERPL-MCNC: 0.73 MG/DL (ref 0.5–1.4)
EOSINOPHIL # BLD AUTO: 0.12 K/UL (ref 0–0.51)
EOSINOPHIL NFR BLD: 2.7 % (ref 0–6.9)
ERYTHROCYTE [DISTWIDTH] IN BLOOD BY AUTOMATED COUNT: 43.7 FL (ref 35.9–50)
GLOBULIN SER CALC-MCNC: 2.6 G/DL (ref 1.9–3.5)
GLUCOSE SERPL-MCNC: 101 MG/DL (ref 65–99)
HCT VFR BLD AUTO: 41.3 % (ref 37–47)
HDLC SERPL-MCNC: 67 MG/DL
HGB BLD-MCNC: 13.7 G/DL (ref 12–16)
IMM GRANULOCYTES # BLD AUTO: 0.01 K/UL (ref 0–0.11)
IMM GRANULOCYTES NFR BLD AUTO: 0.2 % (ref 0–0.9)
LDLC SERPL CALC-MCNC: 137 MG/DL
LYMPHOCYTES # BLD AUTO: 0.92 K/UL (ref 1–4.8)
LYMPHOCYTES NFR BLD: 20.4 % (ref 22–41)
MCH RBC QN AUTO: 30.7 PG (ref 27–33)
MCHC RBC AUTO-ENTMCNC: 33.2 G/DL (ref 33.6–35)
MCV RBC AUTO: 92.6 FL (ref 81.4–97.8)
MONOCYTES # BLD AUTO: 0.38 K/UL (ref 0–0.85)
MONOCYTES NFR BLD AUTO: 8.4 % (ref 0–13.4)
NEUTROPHILS # BLD AUTO: 3.02 K/UL (ref 2–7.15)
NEUTROPHILS NFR BLD: 67 % (ref 44–72)
NRBC # BLD AUTO: 0 K/UL
NRBC BLD-RTO: 0 /100 WBC
PLATELET # BLD AUTO: 259 K/UL (ref 164–446)
PMV BLD AUTO: 10.7 FL (ref 9–12.9)
POTASSIUM SERPL-SCNC: 4.2 MMOL/L (ref 3.6–5.5)
PROT SERPL-MCNC: 6.9 G/DL (ref 6–8.2)
RBC # BLD AUTO: 4.46 M/UL (ref 4.2–5.4)
SODIUM SERPL-SCNC: 143 MMOL/L (ref 135–145)
TRIGL SERPL-MCNC: 84 MG/DL (ref 0–149)
WBC # BLD AUTO: 4.5 K/UL (ref 4.8–10.8)

## 2018-09-12 PROCEDURE — 86304 IMMUNOASSAY TUMOR CA 125: CPT | Mod: GA

## 2018-09-12 PROCEDURE — 80053 COMPREHEN METABOLIC PANEL: CPT

## 2018-09-12 PROCEDURE — 80061 LIPID PANEL: CPT | Mod: GA

## 2018-09-12 PROCEDURE — 85025 COMPLETE CBC W/AUTO DIFF WBC: CPT

## 2018-09-12 PROCEDURE — 83036 HEMOGLOBIN GLYCOSYLATED A1C: CPT | Mod: GA

## 2018-09-12 PROCEDURE — 36415 COLL VENOUS BLD VENIPUNCTURE: CPT

## 2018-09-13 LAB
EST. AVERAGE GLUCOSE BLD GHB EST-MCNC: 137 MG/DL
HBA1C MFR BLD: 6.4 % (ref 0–5.6)

## 2019-01-14 ENCOUNTER — OFFICE VISIT (OUTPATIENT)
Dept: URGENT CARE | Facility: PHYSICIAN GROUP | Age: 79
End: 2019-01-14
Payer: MEDICARE

## 2019-01-14 VITALS
RESPIRATION RATE: 14 BRPM | BODY MASS INDEX: 22.66 KG/M2 | HEART RATE: 97 BPM | OXYGEN SATURATION: 98 % | DIASTOLIC BLOOD PRESSURE: 86 MMHG | WEIGHT: 120 LBS | SYSTOLIC BLOOD PRESSURE: 162 MMHG | TEMPERATURE: 99.5 F | HEIGHT: 61 IN

## 2019-01-14 DIAGNOSIS — S39.012A BACK STRAIN, INITIAL ENCOUNTER: ICD-10-CM

## 2019-01-14 DIAGNOSIS — B02.9 HERPES ZOSTER WITHOUT COMPLICATION: ICD-10-CM

## 2019-01-14 PROCEDURE — 99214 OFFICE O/P EST MOD 30 MIN: CPT | Performed by: FAMILY MEDICINE

## 2019-01-14 RX ORDER — VALACYCLOVIR HYDROCHLORIDE 1 G/1
1000 TABLET, FILM COATED ORAL 3 TIMES DAILY
Qty: 21 TAB | Refills: 0 | Status: SHIPPED | OUTPATIENT
Start: 2019-01-14 | End: 2019-01-21

## 2019-01-14 RX ORDER — IBUPROFEN 200 MG
200 TABLET ORAL EVERY 6 HOURS PRN
COMMUNITY

## 2019-01-14 RX ORDER — BACLOFEN 10 MG/1
10 TABLET ORAL NIGHTLY PRN
Qty: 10 TAB | Refills: 0 | Status: SHIPPED
Start: 2019-01-14 | End: 2020-03-23

## 2019-01-14 ASSESSMENT — ENCOUNTER SYMPTOMS
PERIANAL NUMBNESS: 0
LEG PAIN: 0
PARESIS: 0
BOWEL INCONTINENCE: 0
PARESTHESIAS: 0
TINGLING: 1
BACK PAIN: 1
NUMBNESS: 0
WEAKNESS: 0

## 2019-01-14 NOTE — PROGRESS NOTES
"Subjective:   Janet Hummel is a 78 y.o. female who presents for Back Pain (post heavy lifting on Tues.)        Back Pain   This is a new problem. The current episode started in the past 7 days. The problem occurs constantly. The problem has been rapidly worsening since onset. The pain is present in the thoracic spine. The quality of the pain is described as aching and burning. Associated symptoms include tingling. Pertinent negatives include no bladder incontinence, bowel incontinence, leg pain, numbness, paresis, paresthesias, perianal numbness or weakness.     Review of Systems   Gastrointestinal: Negative for bowel incontinence.   Genitourinary: Negative for bladder incontinence.   Musculoskeletal: Positive for back pain.   Skin: Positive for rash.   Neurological: Positive for tingling. Negative for weakness, numbness and paresthesias.     Allergies   Allergen Reactions   • Nkda [No Known Drug Allergy]       Objective:   BP (!) 162/86   Pulse 97   Temp 37.5 °C (99.5 °F)   Resp 14   Ht 1.549 m (5' 1\")   Wt 54.4 kg (120 lb)   LMP 01/01/1996   SpO2 98%   BMI 22.67 kg/m²   Physical Exam   Constitutional: She is oriented to person, place, and time. She appears well-developed and well-nourished. No distress.   HENT:   Head: Normocephalic and atraumatic.   Eyes: Pupils are equal, round, and reactive to light. Conjunctivae and EOM are normal.   Cardiovascular: Normal rate and regular rhythm.    No murmur heard.  Pulmonary/Chest: Effort normal and breath sounds normal. No respiratory distress.   Abdominal: Soft. She exhibits no distension. There is no tenderness.   Musculoskeletal:        Thoracic back: She exhibits spasm. She exhibits no bony tenderness.   Neurological: She is alert and oriented to person, place, and time. She has normal reflexes. No sensory deficit.   Skin: Skin is warm and dry. Rash noted. Rash is vesicular.   Psychiatric: She has a normal mood and affect.         Assessment/Plan:   1. " Back strain, initial encounter  - baclofen (LIORESAL) 10 MG Tab; Take 1 Tab by mouth at bedtime as needed.  Dispense: 10 Tab; Refill: 0    2. Herpes zoster without complication  - valacyclovir (VALTREX) 1 GM Tab; Take 1 Tab by mouth 3 times a day for 7 days.  Dispense: 21 Tab; Refill: 0    Other orders  - ibuprofen (MOTRIN) 200 MG Tab; Take 200 mg by mouth every 6 hours as needed.    Differential diagnosis, natural history, supportive care, and indications for immediate follow-up discussed.

## 2019-01-21 ENCOUNTER — TELEPHONE (OUTPATIENT)
Dept: MEDICAL GROUP | Facility: PHYSICIAN GROUP | Age: 79
End: 2019-01-21

## 2019-01-21 RX ORDER — AMITRIPTYLINE HYDROCHLORIDE 25 MG/1
25 TABLET, FILM COATED ORAL NIGHTLY PRN
Qty: 30 TAB | Refills: 0 | Status: SHIPPED | OUTPATIENT
Start: 2019-01-21 | End: 2019-05-20

## 2019-01-21 NOTE — TELEPHONE ENCOUNTER
VOICEMAIL  1. Caller Name: Janet                      Call Back Number: [phone] (home)       2. Message: Patient was seen in UC and diagnosed with shingles, her last day of valcyclovir is today and she is still in a lot of pain. Please advise     3. Patient approves office to leave a detailed voicemail/MyChart message: N/A

## 2019-01-21 NOTE — TELEPHONE ENCOUNTER
Please let patient know that it is not unusual to have some pain, even after the shingles are healing.  The nerves are irritated.  I am sending over a nerve pain medication that you take at night.  It can cause some drowsiness, so be aware.  If no improvement, please come see me  MAIKOL Blanco.

## 2019-03-06 ENCOUNTER — OFFICE VISIT (OUTPATIENT)
Dept: MEDICAL GROUP | Facility: PHYSICIAN GROUP | Age: 79
End: 2019-03-06
Payer: MEDICARE

## 2019-03-06 VITALS
HEART RATE: 86 BPM | DIASTOLIC BLOOD PRESSURE: 60 MMHG | BODY MASS INDEX: 21.52 KG/M2 | OXYGEN SATURATION: 95 % | TEMPERATURE: 97.9 F | WEIGHT: 114 LBS | SYSTOLIC BLOOD PRESSURE: 132 MMHG | HEIGHT: 61 IN

## 2019-03-06 DIAGNOSIS — N32.9 BLADDER DISORDER, UNSPECIFIED: ICD-10-CM

## 2019-03-06 DIAGNOSIS — M85.80 OSTEOPENIA, UNSPECIFIED LOCATION: ICD-10-CM

## 2019-03-06 DIAGNOSIS — R73.09 ELEVATED HEMOGLOBIN A1C: ICD-10-CM

## 2019-03-06 DIAGNOSIS — Z78.0 POST-MENOPAUSAL: ICD-10-CM

## 2019-03-06 DIAGNOSIS — R73.9 HYPERGLYCEMIA: ICD-10-CM

## 2019-03-06 DIAGNOSIS — B02.9 HERPES ZOSTER WITHOUT COMPLICATION: ICD-10-CM

## 2019-03-06 DIAGNOSIS — Z12.39 SCREENING FOR BREAST CANCER: ICD-10-CM

## 2019-03-06 DIAGNOSIS — E78.00 ELEVATED LDL CHOLESTEROL LEVEL: ICD-10-CM

## 2019-03-06 PROCEDURE — 99214 OFFICE O/P EST MOD 30 MIN: CPT | Performed by: NURSE PRACTITIONER

## 2019-03-06 ASSESSMENT — PATIENT HEALTH QUESTIONNAIRE - PHQ9: CLINICAL INTERPRETATION OF PHQ2 SCORE: 0

## 2019-03-06 NOTE — ASSESSMENT & PLAN NOTE
Had singles in January.  Right chest area.  Reports some occasional zinging type pain around right breast,  No lesions present.  Has name on list for shingrix vaccine.

## 2019-03-06 NOTE — PROGRESS NOTES
Chief Complaint   Patient presents with   • Herpes Zoster     follow up/ followup   • Medication Management     vitamin question       Subjective:   Janet Hummel is a 78 y.o. female here today for evaluation and management of:    Herpes zoster without complication  Had singles in January.  Right chest area.  Reports some occasional zinging type pain around right breast,  No lesions present.  Has name on list for shingrix vaccine.     Bladder disorder, unspecified  Reports she is seeing GYN regarding prolapse of bladder.  Dr. Lopez is following this issue.      Hyperglycemia  Walking most days.  [facility]  Due for A1c check.  BMI is 21.54.     Elevated hemoglobin A1c  A1c 6.4 in September.  Has been exercising and watching carbs.      Osteopenia  Due for dexa scan.  Is taking vitamin D and K2 from a previous provider.  Not currently taking calcium.  No recent falls reported.  Will recheck dexa. Exercising regularly.            Current medicines (including changes today)  Current Outpatient Prescriptions   Medication Sig Dispense Refill   • Biotin 5000 MCG Tab Take  by mouth.     • Cholecalciferol (VITAMIN D3) 5000 UNITS Cap Take 1 Cap by mouth every day.     • ascorbic acid (ASCORBIC ACID) 500 MG Tab Take 500 mg by mouth every day.     • magnesium oxide (MAG-OX) 400 MG Tab Take 400 mg by mouth every day.     • B Complex Vitamins (B COMPLEX PO) Take  by mouth.     • Menaquinone-7 (VITAMIN K2 PO) Take  by mouth.     • Turmeric 500 MG Tab Take  by mouth.     • Omega-3 Fatty Acids (FISH OIL) 1000 MG CAPS capsule Take 1,000 mg by mouth 3 times a day, with meals.     • GLUCOSAMINE CHONDRO COMPLEX PO Take 2 Tabs by mouth every morning.     • amitriptyline (ELAVIL) 25 MG Tab Take 1 Tab by mouth at bedtime as needed. (Patient not taking: Reported on 3/6/2019) 30 Tab 0   • ibuprofen (MOTRIN) 200 MG Tab Take 200 mg by mouth every 6 hours as needed.     • baclofen (LIORESAL) 10 MG Tab Take 1 Tab by  "mouth at bedtime as needed. (Patient not taking: Reported on 3/6/2019) 10 Tab 0   • tetanus-dipth-acell pertussis (ADACEL) 5-2-15.5 LF-MCG/0.5 Suspension 0.5 mL by Intramuscular route Once PRN for up to 1 dose. Please fax when administered so we can update our records (Patient not taking: Reported on 3/6/2019) 1 mL 0     No current facility-administered medications for this visit.      She  has a past medical history of Allergy; Arthritis; CATARACT; Osteoporosis; and Right shoulder pain (8/12/2013).    ROS as stated in hpi  No chest pain, no shortness of breath, no abdominal pain       Objective:     Blood pressure 132/60, pulse 86, temperature 36.6 °C (97.9 °F), temperature source Temporal, height 1.549 m (5' 1\"), weight 51.7 kg (114 lb), last menstrual period 01/01/1996, SpO2 95 %, not currently breastfeeding. Body mass index is 21.54 kg/m².   Physical Exam:  Constitutional: Alert, no distress.  Skin: Warm, dry, good turgor,no cyanosis, no rashes in visible areas.  Eye: Equal, round and reactive, conjunctiva clear, lids normal.  Ears: No tenderness, no discharge.  External canals are without any significant edema or erythema.  Tympanic membranes are without any inflammation, no effusion.  Gross auditory acuity is intact.  Nose: symmetrical without tenderness, no discharge.  Mouth/Throat: lips without lesion.  Oropharynx clear.  Throat without erythema, exudates or tonsillar enlargement.  Neck: Trachea midline, no masses, no obvious thyroid enlargement.. No cervical or supraclavicular lymphadenopathy. Range of motion within normal limits.  Neuro: Cranial nerves 2-12 grossly intact.  No sensory deficit.  Respiratory: Unlabored respiratory effort, lungs clear to auscultation, no wheezes, no ronchi.  Cardiovascular: Normal S1, S2, no murmur, no edema.  Abdomen: Soft, non-tender, no masses, no guarding,  no hepatosplenomegaly.  Psych: Alert and oriented x3, normal affect and mood and judgement.        Assessment and " Plan:   The following treatment plan was discussed    1. Herpes zoster without complication  Chronic, ongoing, stable.  Discussed nerve pain and its course.  She is not wanting any medication at this time.  Recommended shingrix vaccine.  She is on the wait list at [facility].  Monitor.     2. Bladder disorder, unspecified  This is a new problem to me.  Chronic, ongoing. Followed by Dr. Lopez.  No burning reported.  Some urge incontinence.     3. Hyperglycemia  Chronic, ongoing.  Due for lab check.  Orders provided.  She has been watching carbs and exercising regularly.  BMI 21.54.     4. Elevated LDL cholesterol level  Chronic, ongoing finding on last cholesterol in September.  Eating healthy.  We will redraw labs in April to see if they have improved.  Continue healthy lifestyle management.   - Lipid Profile; Future    5. Elevated hemoglobin A1c  See #3  - Comp Metabolic Panel; Future  - HEMOGLOBIN A1C; Future    6. Screening for breast cancer  Due for mammogram.  No breast concerns.  Order provided.  Monitor and follow.   - MA-SCREEN MAMMO W/CAD-BILAT; Future    7. Osteopenia, unspecified location  Chronic, ongoing. Due for DEXA.  Will order, monitor and follow.  Continue Vitamin D.  Consider calcium supplementation.  Continue good exercise program.      8. Post-menopausal  See #7  - DS-BONE DENSITY STUDY (DEXA); Future      Followup: Return in about 1 year (around 3/6/2020).         Educated in proper administration of medication(s) ordered today including safety, possible SE, risks, benefits, rationale and alternatives to therapy.     Please note that this dictation was created using voice recognition software. I have made every reasonable attempt to correct obvious errors, but I expect that there are errors of grammar and possibly content that I did not discover before finalizing the note.

## 2019-03-06 NOTE — ASSESSMENT & PLAN NOTE
Due for dexa scan.  Is taking vitamin D and K2 from a previous provider.  Not currently taking calcium.  No recent falls reported.  Will recheck dexa. Exercising regularly.

## 2019-04-24 ENCOUNTER — HOSPITAL ENCOUNTER (OUTPATIENT)
Dept: RADIOLOGY | Facility: MEDICAL CENTER | Age: 79
End: 2019-04-24
Attending: NURSE PRACTITIONER
Payer: MEDICARE

## 2019-04-24 DIAGNOSIS — Z12.39 SCREENING FOR BREAST CANCER: ICD-10-CM

## 2019-04-24 DIAGNOSIS — Z78.0 POST-MENOPAUSAL: ICD-10-CM

## 2019-04-24 PROCEDURE — 77063 BREAST TOMOSYNTHESIS BI: CPT

## 2019-04-24 PROCEDURE — 77080 DXA BONE DENSITY AXIAL: CPT

## 2019-05-03 ENCOUNTER — HOSPITAL ENCOUNTER (OUTPATIENT)
Dept: LAB | Facility: MEDICAL CENTER | Age: 79
End: 2019-05-03
Attending: NURSE PRACTITIONER
Payer: MEDICARE

## 2019-05-03 DIAGNOSIS — E78.00 ELEVATED LDL CHOLESTEROL LEVEL: ICD-10-CM

## 2019-05-03 DIAGNOSIS — R73.09 ELEVATED HEMOGLOBIN A1C: ICD-10-CM

## 2019-05-03 LAB
ALBUMIN SERPL BCP-MCNC: 4.2 G/DL (ref 3.2–4.9)
ALBUMIN/GLOB SERPL: 1.9 G/DL
ALP SERPL-CCNC: 74 U/L (ref 30–99)
ALT SERPL-CCNC: 15 U/L (ref 2–50)
ANION GAP SERPL CALC-SCNC: 6 MMOL/L (ref 0–11.9)
AST SERPL-CCNC: 21 U/L (ref 12–45)
BILIRUB SERPL-MCNC: 0.8 MG/DL (ref 0.1–1.5)
BUN SERPL-MCNC: 19 MG/DL (ref 8–22)
CALCIUM SERPL-MCNC: 9.2 MG/DL (ref 8.5–10.5)
CHLORIDE SERPL-SCNC: 108 MMOL/L (ref 96–112)
CHOLEST SERPL-MCNC: 207 MG/DL (ref 100–199)
CO2 SERPL-SCNC: 27 MMOL/L (ref 20–33)
CREAT SERPL-MCNC: 0.7 MG/DL (ref 0.5–1.4)
EST. AVERAGE GLUCOSE BLD GHB EST-MCNC: 134 MG/DL
GLOBULIN SER CALC-MCNC: 2.2 G/DL (ref 1.9–3.5)
GLUCOSE SERPL-MCNC: 105 MG/DL (ref 65–99)
HBA1C MFR BLD: 6.3 % (ref 0–5.6)
HDLC SERPL-MCNC: 73 MG/DL
LDLC SERPL CALC-MCNC: 117 MG/DL
POTASSIUM SERPL-SCNC: 4.3 MMOL/L (ref 3.6–5.5)
PROT SERPL-MCNC: 6.4 G/DL (ref 6–8.2)
SODIUM SERPL-SCNC: 141 MMOL/L (ref 135–145)
TRIGL SERPL-MCNC: 83 MG/DL (ref 0–149)

## 2019-05-03 PROCEDURE — 80053 COMPREHEN METABOLIC PANEL: CPT

## 2019-05-03 PROCEDURE — 36415 COLL VENOUS BLD VENIPUNCTURE: CPT

## 2019-05-03 PROCEDURE — 83036 HEMOGLOBIN GLYCOSYLATED A1C: CPT

## 2019-05-03 PROCEDURE — 80061 LIPID PANEL: CPT

## 2019-05-20 ENCOUNTER — OFFICE VISIT (OUTPATIENT)
Dept: MEDICAL GROUP | Facility: PHYSICIAN GROUP | Age: 79
End: 2019-05-20
Payer: MEDICARE

## 2019-05-20 VITALS
TEMPERATURE: 98.2 F | OXYGEN SATURATION: 97 % | SYSTOLIC BLOOD PRESSURE: 150 MMHG | WEIGHT: 120 LBS | BODY MASS INDEX: 22.66 KG/M2 | DIASTOLIC BLOOD PRESSURE: 58 MMHG | RESPIRATION RATE: 14 BRPM | HEART RATE: 82 BPM | HEIGHT: 61 IN

## 2019-05-20 DIAGNOSIS — N32.9 BLADDER DISORDER, UNSPECIFIED: ICD-10-CM

## 2019-05-20 DIAGNOSIS — R73.09 ELEVATED HEMOGLOBIN A1C: ICD-10-CM

## 2019-05-20 DIAGNOSIS — M85.89 OSTEOPENIA OF MULTIPLE SITES: ICD-10-CM

## 2019-05-20 PROCEDURE — 99214 OFFICE O/P EST MOD 30 MIN: CPT | Performed by: NURSE PRACTITIONER

## 2019-05-20 RX ORDER — ALENDRONATE SODIUM 35 MG/1
35 TABLET ORAL
Qty: 12 TAB | Refills: 3 | Status: SHIPPED
Start: 2019-05-20 | End: 2020-03-23

## 2019-05-20 NOTE — PROGRESS NOTES
Chief Complaint   Patient presents with   • Follow-Up   • Results     labs and imaging        Subjective:   Janet Hummel is a 79 y.o. female here today for evaluation and management of:    Elevated hemoglobin A1c  Recent labs show improvement of A1c from 6.4 to 6.3. Watching sweets.  [facility] and [facility] class.      Osteopenia  DEXA scan shows increased osteopenia.  Has not been recently taking calcium/vitamin D, but has started taking this.  Is taking K2. Both parents fractured hips.  Will start fosamax.     Bladder disorder, unspecified  Has upcoming appointment with Dr. Lopez for this issue.  Doing keagel exercise.            Current medicines (including changes today)  Current Outpatient Prescriptions   Medication Sig Dispense Refill   • alendronate (FOSAMAX) 35 MG tablet Take 1 Tab by mouth every 7 days. 12 Tab 3   • Biotin 5000 MCG Tab Take  by mouth.     • Cholecalciferol (VITAMIN D3) 5000 UNITS Cap Take 1 Cap by mouth every day.     • ascorbic acid (ASCORBIC ACID) 500 MG Tab Take 500 mg by mouth every day.     • magnesium oxide (MAG-OX) 400 MG Tab Take 400 mg by mouth every day.     • B Complex Vitamins (B COMPLEX PO) Take  by mouth.     • Menaquinone-7 (VITAMIN K2 PO) Take  by mouth.     • Turmeric 500 MG Tab Take  by mouth.     • Omega-3 Fatty Acids (FISH OIL) 1000 MG CAPS capsule Take 1,000 mg by mouth 3 times a day, with meals.     • ibuprofen (MOTRIN) 200 MG Tab Take 200 mg by mouth every 6 hours as needed.     • baclofen (LIORESAL) 10 MG Tab Take 1 Tab by mouth at bedtime as needed. (Patient not taking: Reported on 3/6/2019) 10 Tab 0   • GLUCOSAMINE CHONDRO COMPLEX PO Take 2 Tabs by mouth every morning.       No current facility-administered medications for this visit.      She  has a past medical history of Allergy; Arthritis; CATARACT; Osteopenia; Osteoporosis; and Right shoulder pain (8/12/2013).    ROS as stated in hpi  No chest pain, no shortness of breath, no abdominal  "pain       Objective:     /58 (BP Location: Left arm, Patient Position: Sitting)   Pulse 82   Temp 36.8 °C (98.2 °F) (Temporal)   Resp 14   Ht 1.549 m (5' 1\")   Wt 54.4 kg (120 lb)   SpO2 97%  Body mass index is 22.67 kg/m². stable.   Physical Exam:  Constitutional: Alert, no distress.  Skin: Warm, dry, good turgor,no cyanosis, no rashes in visible areas.  Eye: Equal, round and reactive, conjunctiva clear, lids normal.  Ears: No tenderness, no discharge.  External canals are without any significant edema or erythema. Gross auditory acuity is intact.  Nose: symmetrical without tenderness, no discharge.  Mouth/Throat: lips without lesion.  Oropharynx clear.    Neck: Trachea midline, no masses, no obvious thyroid enlargement.. Range of motion within normal limits.  Neuro: Cranial nerves 2-12 grossly intact.  No sensory deficit.  Respiratory: Unlabored respiratory effort, lungs clear to auscultation, no wheezes, no ronchi.  Cardiovascular: Normal S1, S2, no murmur, no edema.  Psych: Alert and oriented x3, normal affect and mood and judgement.        Assessment and Plan:   The following treatment plan was discussed    1. Elevated hemoglobin A1c  Chornic, ongoing, improved.  Labs reviewed.  Continue with good weight control, exercise and diet.  Monitor and follow.     2. Osteopenia of multiple sites  Chronic, ongoing, worsening.  Will start fosomax weekly.  Discussed bone loss.  Continue with calcium/vit D as directed.  Fall risks reviewed.  Monitor and follow.     3. Bladder disorder, unspecified  Chronic, ongoing, worsening.  Has upcoming appointment with Dr. Lopez, GYN.  No infectious symptoms at this time.  Monitor.       Followup: Return in about 1 year (around 5/20/2020) for Annual.         Educated in proper administration of medication(s) ordered today including safety, possible SE, risks, benefits, rationale and alternatives to therapy.     Please note that this dictation was created using voice " recognition software. I have made every reasonable attempt to correct obvious errors, but I expect that there are errors of grammar and possibly content that I did not discover before finalizing the note.

## 2019-05-20 NOTE — ASSESSMENT & PLAN NOTE
Recent labs show improvement of A1c from 6.4 to 6.3. Watching sweets.  [facility].

## 2019-05-20 NOTE — ASSESSMENT & PLAN NOTE
DEXA scan shows increased osteopenia.  Has not been recently taking calcium/vitamin D, but has started taking this.  Is taking K2. Both parents fractured hips.  Will start fosamax.

## 2019-08-02 ENCOUNTER — HOSPITAL ENCOUNTER (OUTPATIENT)
Dept: RADIOLOGY | Facility: MEDICAL CENTER | Age: 79
End: 2019-08-02
Attending: PHYSICIAN ASSISTANT
Payer: MEDICARE

## 2019-08-02 DIAGNOSIS — M75.102 TEAR OF LEFT ROTATOR CUFF, UNSPECIFIED TEAR EXTENT, UNSPECIFIED WHETHER TRAUMATIC: ICD-10-CM

## 2019-08-02 DIAGNOSIS — M25.512 LEFT SHOULDER PAIN, UNSPECIFIED CHRONICITY: ICD-10-CM

## 2019-08-02 PROCEDURE — 73221 MRI JOINT UPR EXTREM W/O DYE: CPT | Mod: LT

## 2020-03-23 ENCOUNTER — OFFICE VISIT (OUTPATIENT)
Dept: MEDICAL GROUP | Facility: PHYSICIAN GROUP | Age: 80
End: 2020-03-23
Payer: MEDICARE

## 2020-03-23 VITALS
SYSTOLIC BLOOD PRESSURE: 126 MMHG | BODY MASS INDEX: 22.26 KG/M2 | WEIGHT: 121 LBS | OXYGEN SATURATION: 97 % | TEMPERATURE: 98 F | DIASTOLIC BLOOD PRESSURE: 60 MMHG | HEIGHT: 62 IN | HEART RATE: 92 BPM

## 2020-03-23 DIAGNOSIS — Z00.00 PREVENTATIVE HEALTH CARE: ICD-10-CM

## 2020-03-23 DIAGNOSIS — R73.09 ELEVATED HEMOGLOBIN A1C: ICD-10-CM

## 2020-03-23 DIAGNOSIS — Z12.39 SCREENING FOR BREAST CANCER: ICD-10-CM

## 2020-03-23 DIAGNOSIS — M85.89 OSTEOPENIA OF MULTIPLE SITES: ICD-10-CM

## 2020-03-23 DIAGNOSIS — N32.9 BLADDER DISORDER, UNSPECIFIED: ICD-10-CM

## 2020-03-23 PROCEDURE — G0439 PPPS, SUBSEQ VISIT: HCPCS | Performed by: NURSE PRACTITIONER

## 2020-03-23 ASSESSMENT — FIBROSIS 4 INDEX: FIB4 SCORE: 1.63

## 2020-03-23 ASSESSMENT — ENCOUNTER SYMPTOMS: GENERAL WELL-BEING: GOOD

## 2020-03-23 ASSESSMENT — ACTIVITIES OF DAILY LIVING (ADL): BATHING_REQUIRES_ASSISTANCE: 0

## 2020-03-23 ASSESSMENT — PATIENT HEALTH QUESTIONNAIRE - PHQ9: CLINICAL INTERPRETATION OF PHQ2 SCORE: 0

## 2020-03-23 NOTE — PROGRESS NOTES
Chief Complaint   Patient presents with   • Annual Wellness Visit         HPI:  Janet is a 80 y.o. here for Medicare Annual Wellness Visit        Patient Active Problem List    Diagnosis Date Noted   • Herpes zoster without complication 03/06/2019   • Bladder disorder, unspecified 03/06/2019   • Elevated hemoglobin A1c 03/06/2019   • Hyperglycemia 10/14/2015   • Osteopenia 09/25/2013   • OA (osteoarthritis) 10/26/2011   • Allergic rhinitis 10/26/2011   • Ringing in ears 05/28/2009       Current Outpatient Medications   Medication Sig Dispense Refill   • ibuprofen (MOTRIN) 200 MG Tab Take 200 mg by mouth every 6 hours as needed.     • Cholecalciferol (VITAMIN D3) 5000 UNITS Cap Take 1 Cap by mouth every day.     • ascorbic acid (ASCORBIC ACID) 500 MG Tab Take 500 mg by mouth every day.     • magnesium oxide (MAG-OX) 400 MG Tab Take 400 mg by mouth every day.     • B Complex Vitamins (B COMPLEX PO) Take  by mouth.     • Menaquinone-7 (VITAMIN K2 PO) Take  by mouth.     • Turmeric 500 MG Tab Take  by mouth.     • Omega-3 Fatty Acids (FISH OIL) 1000 MG CAPS capsule Take 1,000 mg by mouth 3 times a day, with meals.     • GLUCOSAMINE CHONDRO COMPLEX PO Take 2 Tabs by mouth every morning.       No current facility-administered medications for this visit.         Patient is taking medications as noted in medication list.  Current supplements as per medication list.     Allergies: Nkda [no known drug allergy]    Current social contact/activities: goes to [employer], plays cards, volunteers  Works at [employer]      Is patient current with immunizations? No, due for SHINGRIX (Shingles). Patient is interested in receiving NONE today.    She  reports that she has never smoked. She has never used smokeless tobacco. She reports that she does not drink alcohol or use drugs.  Counseling given: Not Answered        DPA/Advanced directive: Patient has Advanced Directive on file.     ROS:    Gait: Uses no assistive device   Ostomy: No    Other tubes: No   Amputations: No   Chronic oxygen use No   Last eye exam 08/2019   Wears hearing aids: No   : Denies any urinary leakage during the last 6 months  Has a pessary in place and followed by GYN    Screening:        Depression Screening    Little interest or pleasure in doing things?  0 - not at all  Feeling down, depressed, or hopeless? 0 - not at all  Patient Health Questionnaire Score: 0    If depressive symptoms identified deferred to follow up visit unless specifically addressed in assessment and plan.    Interpretation of PHQ-9 Total Score   Score Severity   1-4 No Depression   5-9 Mild Depression   10-14 Moderate Depression   15-19 Moderately Severe Depression   20-27 Severe Depression    Screening for Cognitive Impairment    Three Minute Recall (village, kitchen, baby)  3/3    Pérez clock face with all 12 numbers and set the hands to show 10 past 10.  Yes 4/5  If cognitive concerns identified, deferred for follow up unless specifically addressed in assessment and plan.    Fall Risk Assessment    Has the patient had two or more falls in the last year or any fall with injury in the last year?  No  If fall risk identified, deferred for follow up unless specifically addressed in assessment and plan.    Safety Assessment    Throw rugs on floor.  Yes  Handrails on all stairs.  Yes  Good lighting in all hallways.  Yes  Difficulty hearing.  No  Patient counseled about all safety risks that were identified.    Functional Assessment ADLs    Are there any barriers preventing you from cooking for yourself or meeting nutritional needs?  No.    Are there any barriers preventing you from driving safely or obtaining transportation?  No.    Are there any barriers preventing you from using a telephone or calling for help?  No.    Are there any barriers preventing you from shopping?  No.    Are there any barriers preventing you from taking care of your own finances?  No.    Are there any barriers preventing you  from managing your medications?  No.    Are there any barriers preventing you from showering, bathing or dressing yourself?  No.    Are you currently engaging in any exercise or physical activity?  Yes.  Does go to stretching at [facility]  What is your perception of your health?  Good.    Health Maintenance Summary                IMM DTaP/Tdap/Td Vaccine Overdue 3/22/1951     IMM ZOSTER VACCINES Overdue 12/31/2014      Done 11/5/2014 Imm Admin: Zoster Vaccine Live (ZVL) (Zostavax)    MAMMOGRAM Next Due 4/24/2020      Done 4/24/2019 MA-SCREENING MAMMO BILAT W/TOMOSYNTHESIS W/CAD     Patient has more history with this topic...    BONE DENSITY Next Due 4/24/2024      Done 4/24/2019 DS-BONE DENSITY STUDY (DEXA)     Patient has more history with this topic...          Patient Care Team:  RONNIE Blanco as PCP - General (Family Medicine)  Bipin Menjivar M.D. as Consulting Physician (Ophthalmology)  Matt Winslow M.D. as Consulting Physician (Allergy)  Brant Fernandez M.D. as Consulting Physician (Orthopaedics)  Tani Solorzano M.D. as Consulting Physician (Gastroenterology)  Yohan Sims M.D. as Consulting Physician (Otolaryngology)  César Mancini D.P.M. as Consulting Physician (Podiatry)  [facility]  Dr. David Hauser, [facility]IDYS (Dentistry)  [facility]    Social History     Tobacco Use   • Smoking status: Never Smoker   • Smokeless tobacco: Never Used   Substance Use Topics   • Alcohol use: No     Alcohol/week: 0.0 oz   • Drug use: No     Family History   Problem Relation Age of Onset   • Thyroid Mother    • Cancer Sister         ovarian     She  has a past medical history of Allergy, Arthritis, CATARACT, Osteopenia, Osteoporosis, and Right shoulder pain (8/12/2013).   Past Surgical History:   Procedure Laterality Date   • LOW ANTERIOR RESECTION LAPAROSCOPIC  9/4/2012    Performed by T[facility]INIVASAN at [facility]   • UMBILICAL HERNIA REPAIR  9/17/2009     "Performed by JUDITH LOZA at SURGERY [facility]   • CATARACT EXTRACTION WITH IOL     • COLONOSCOPY     • ROTATOR CUFF REPAIR Right    • TONSILLECTOMY             Exam:     /60 (BP Location: Left arm, Patient Position: Sitting, BP Cuff Size: Adult)   Pulse 92   Temp 36.7 °C (98 °F)   Ht 1.562 m (5' 1.5\")   Wt 54.9 kg (121 lb)   SpO2 97%  Body mass index is 22.49 kg/m².    Hearing good.    Dentition good  Alert, oriented in no acute distress.  Eye contact is good, speech goal directed, affect calm  CV:  Regular S1, S2, lungs clear to examiner    Assessment and Plan. The following treatment and monitoring plan is recommended:    1. Osteopenia of multiple sites     2. Elevated hemoglobin A1c  HEMOGLOBIN A1C   3. Preventative health care  CBC WITH DIFFERENTIAL    Comp Metabolic Panel    Lipid Profile   4. Screening for breast cancer  MA-SCREENING MAMMO BILAT W/CAD   5. Bladder disorder, unspecified       1. Osteopenia of multiple sites  Chronic, ongoing. Patient stopped taking fosamax.  Taking liquid calcium supplementation.  Walking and doing stretch and balance classes.     2. Elevated hemoglobin A1c  Chronic, ongoing.  Prediabetic at 6.3 last year.  Advised to decrease sweets.  Increase walking.  Lab orders provided.  Monitor and follow.   - HEMOGLOBIN A1C; Future    3. Preventative health care  Due for annual labs.  Orders provided.  Monitor and follow.   - CBC WITH DIFFERENTIAL; Future  - Comp Metabolic Panel; Future  - Lipid Profile; Future    4. Screening for breast cancer  Due for mammogram.  No breast changes reported.  Order provided.  Monitor and follow.   - MA-SCREENING MAMMO BILAT W/CAD; Future    5. Bladder disorder, unspecified  Chronic, ongoing. Followed by GYN.  Currently has Pessary in place.  Sees GYN every 3 months.  No recent UTI reported.        Services suggested: No services needed at this time  Health Care Screening recommendations as per orders if indicated.  Referrals " offered: PT/OT/Nutrition counseling/Behavioral Health/Smoking cessation as per orders if indicated.    Discussion today about general wellness and lifestyle habits:    · Prevent falls and reduce trip hazards; Cautioned about securing or removing rugs.  · Have a working fire alarm and carbon monoxide detector;   · Engage in regular physical activity and social activities       Follow-up: Return in about 1 year (around 3/23/2021) for Annual.

## 2020-03-23 NOTE — ASSESSMENT & PLAN NOTE
Reports that she stopped the fosamax.  Taking liquid bone support calcium/magnesium/vitamin D. Going to stretch and tone class.  Physical therapy for recent left shoulder surgery.

## 2020-05-26 ENCOUNTER — HOSPITAL ENCOUNTER (OUTPATIENT)
Dept: RADIOLOGY | Facility: MEDICAL CENTER | Age: 80
End: 2020-05-26
Attending: NURSE PRACTITIONER
Payer: MEDICARE

## 2020-05-26 DIAGNOSIS — Z12.31 VISIT FOR SCREENING MAMMOGRAM: ICD-10-CM

## 2020-05-26 PROCEDURE — 77067 SCR MAMMO BI INCL CAD: CPT

## 2020-05-28 ENCOUNTER — HOSPITAL ENCOUNTER (OUTPATIENT)
Dept: LAB | Facility: MEDICAL CENTER | Age: 80
End: 2020-05-28
Attending: NURSE PRACTITIONER
Payer: MEDICARE

## 2020-05-28 DIAGNOSIS — Z00.00 PREVENTATIVE HEALTH CARE: ICD-10-CM

## 2020-05-28 DIAGNOSIS — R73.09 ELEVATED HEMOGLOBIN A1C: ICD-10-CM

## 2020-05-28 LAB
ALBUMIN SERPL BCP-MCNC: 4.3 G/DL (ref 3.2–4.9)
ALBUMIN/GLOB SERPL: 1.8 G/DL
ALP SERPL-CCNC: 77 U/L (ref 30–99)
ALT SERPL-CCNC: 17 U/L (ref 2–50)
ANION GAP SERPL CALC-SCNC: 10 MMOL/L (ref 7–16)
AST SERPL-CCNC: 20 U/L (ref 12–45)
BASOPHILS # BLD AUTO: 1.2 % (ref 0–1.8)
BASOPHILS # BLD: 0.06 K/UL (ref 0–0.12)
BILIRUB SERPL-MCNC: 0.8 MG/DL (ref 0.1–1.5)
BUN SERPL-MCNC: 19 MG/DL (ref 8–22)
CALCIUM SERPL-MCNC: 8.8 MG/DL (ref 8.5–10.5)
CHLORIDE SERPL-SCNC: 105 MMOL/L (ref 96–112)
CHOLEST SERPL-MCNC: 223 MG/DL (ref 100–199)
CO2 SERPL-SCNC: 25 MMOL/L (ref 20–33)
CREAT SERPL-MCNC: 0.63 MG/DL (ref 0.5–1.4)
EOSINOPHIL # BLD AUTO: 0.12 K/UL (ref 0–0.51)
EOSINOPHIL NFR BLD: 2.4 % (ref 0–6.9)
ERYTHROCYTE [DISTWIDTH] IN BLOOD BY AUTOMATED COUNT: 43 FL (ref 35.9–50)
EST. AVERAGE GLUCOSE BLD GHB EST-MCNC: 131 MG/DL
FASTING STATUS PATIENT QL REPORTED: NORMAL
GLOBULIN SER CALC-MCNC: 2.4 G/DL (ref 1.9–3.5)
GLUCOSE SERPL-MCNC: 100 MG/DL (ref 65–99)
HBA1C MFR BLD: 6.2 % (ref 0–5.6)
HCT VFR BLD AUTO: 42.3 % (ref 37–47)
HDLC SERPL-MCNC: 79 MG/DL
HGB BLD-MCNC: 14 G/DL (ref 12–16)
IMM GRANULOCYTES # BLD AUTO: 0.01 K/UL (ref 0–0.11)
IMM GRANULOCYTES NFR BLD AUTO: 0.2 % (ref 0–0.9)
LDLC SERPL CALC-MCNC: 128 MG/DL
LYMPHOCYTES # BLD AUTO: 1.21 K/UL (ref 1–4.8)
LYMPHOCYTES NFR BLD: 23.8 % (ref 22–41)
MCH RBC QN AUTO: 30.6 PG (ref 27–33)
MCHC RBC AUTO-ENTMCNC: 33.1 G/DL (ref 33.6–35)
MCV RBC AUTO: 92.4 FL (ref 81.4–97.8)
MONOCYTES # BLD AUTO: 0.49 K/UL (ref 0–0.85)
MONOCYTES NFR BLD AUTO: 9.6 % (ref 0–13.4)
NEUTROPHILS # BLD AUTO: 3.2 K/UL (ref 2–7.15)
NEUTROPHILS NFR BLD: 62.8 % (ref 44–72)
NRBC # BLD AUTO: 0 K/UL
NRBC BLD-RTO: 0 /100 WBC
PLATELET # BLD AUTO: 235 K/UL (ref 164–446)
PMV BLD AUTO: 9.9 FL (ref 9–12.9)
POTASSIUM SERPL-SCNC: 4.4 MMOL/L (ref 3.6–5.5)
PROT SERPL-MCNC: 6.7 G/DL (ref 6–8.2)
RBC # BLD AUTO: 4.58 M/UL (ref 4.2–5.4)
SODIUM SERPL-SCNC: 140 MMOL/L (ref 135–145)
TRIGL SERPL-MCNC: 80 MG/DL (ref 0–149)
WBC # BLD AUTO: 5.1 K/UL (ref 4.8–10.8)

## 2020-05-28 PROCEDURE — 83036 HEMOGLOBIN GLYCOSYLATED A1C: CPT | Mod: GA

## 2020-05-28 PROCEDURE — 80061 LIPID PANEL: CPT | Mod: GA

## 2020-05-28 PROCEDURE — 36415 COLL VENOUS BLD VENIPUNCTURE: CPT | Mod: GA

## 2020-05-28 PROCEDURE — 85025 COMPLETE CBC W/AUTO DIFF WBC: CPT

## 2020-05-28 PROCEDURE — 80053 COMPREHEN METABOLIC PANEL: CPT

## 2020-06-04 ENCOUNTER — PATIENT MESSAGE (OUTPATIENT)
Dept: MEDICAL GROUP | Facility: PHYSICIAN GROUP | Age: 80
End: 2020-06-04

## 2021-01-11 DIAGNOSIS — Z23 NEED FOR VACCINATION: ICD-10-CM

## 2021-02-23 ENCOUNTER — IMMUNIZATION (OUTPATIENT)
Dept: FAMILY PLANNING/WOMEN'S HEALTH CLINIC | Facility: IMMUNIZATION CENTER | Age: 81
End: 2021-02-23
Attending: INTERNAL MEDICINE
Payer: MEDICARE

## 2021-02-23 DIAGNOSIS — Z23 ENCOUNTER FOR VACCINATION: Primary | ICD-10-CM

## 2021-02-23 DIAGNOSIS — Z23 NEED FOR VACCINATION: ICD-10-CM

## 2021-02-23 PROCEDURE — 91300 PFIZER SARS-COV-2 VACCINE: CPT

## 2021-02-23 PROCEDURE — 0001A PFIZER SARS-COV-2 VACCINE: CPT

## 2021-03-17 ENCOUNTER — IMMUNIZATION (OUTPATIENT)
Dept: FAMILY PLANNING/WOMEN'S HEALTH CLINIC | Facility: IMMUNIZATION CENTER | Age: 81
End: 2021-03-17
Attending: INTERNAL MEDICINE
Payer: MEDICARE

## 2021-03-17 DIAGNOSIS — Z23 ENCOUNTER FOR VACCINATION: Primary | ICD-10-CM

## 2021-03-17 PROCEDURE — 91300 PFIZER SARS-COV-2 VACCINE: CPT

## 2021-03-17 PROCEDURE — 0002A PFIZER SARS-COV-2 VACCINE: CPT

## 2021-03-18 ENCOUNTER — TELEPHONE (OUTPATIENT)
Dept: MEDICAL GROUP | Facility: PHYSICIAN GROUP | Age: 81
End: 2021-03-18

## 2021-03-18 NOTE — TELEPHONE ENCOUNTER
Future Appointments       Provider Department Center    3/24/2021 9:40 AM RONNIE Blanco [facility]      ANNUAL WELLNESS VISIT PRE-VISIT PLANNING    1.  Reviewed notes from the last office visit: Yes, LOV 03/23/2020    2.  If any orders were ordered or intended to be done prior to visit (i.e. 6 mos follow-up), do we have results/consult notes or has patient scheduled?        •  Labs - Labs were not ordered at last office visit.  Note: If patient appointment is for lab review and patient did not complete labs, check with provider if OK to reschedule patient until labs completed.       •  Imaging - Imaging was not ordered at last office visit.       •  Referrals - No referrals were ordered at last office visit.    3.  Immunizations were updated in Epic using Reconcile Outside Information activity? Yes       •  Is patient due for Tdap? YES. Patient was notified of copay/out of pocket cost.       •  Is patient due for Shingrix? YES. Patient was notified of copay/out of pocket cost.    4.  Patient is due for the following Health Maintenance Topics:   Health Maintenance Due   Topic Date Due   • IMM DTaP/Tdap/Td Vaccine (1 - Tdap) Never done   • IMM ZOSTER VACCINES (2 of 3) 12/31/2014   • Annual Wellness Visit  03/09/2018     5.  Reviewed/Updated the following with patient:       •   Preferred Pharmacy? Yes       •   Preferred Lab? Yes       •   Preferred Communication? Yes       •   Allergies? Yes       •   Medications? YES. Was Abstract Encounter opened and chart updated? YES       •   Social History? Yes       •   Family History (document living status of immediate family members and if + hx of  cancer, diabetes, hypertension, hyperlipidemia, heart attack, stroke) Yes    6.  Care Team Updated:       •   DME Company (gait device, O2, CPAP, etc.): NO       •   Other Specialists (eye doctor, derm, GYN, cardiology, endo, etc): YES    7.  Patient was advised: “This is a free wellness visit.  The provider will screen for medical conditions to help you stay healthy. If you have other concerns to address you may be asked to discuss these at a separate visit or there may be an additional fee.”     8.  AHA (Puls8) form printed for Provider? N/A   Patient advised to arrive 15 minutes prior to scheduled appointment

## 2021-03-19 RX ORDER — AMPICILLIN TRIHYDRATE 250 MG
CAPSULE ORAL
COMMUNITY

## 2021-04-12 ENCOUNTER — OFFICE VISIT (OUTPATIENT)
Dept: MEDICAL GROUP | Facility: PHYSICIAN GROUP | Age: 81
End: 2021-04-12
Payer: MEDICARE

## 2021-04-12 VITALS
OXYGEN SATURATION: 96 % | TEMPERATURE: 98.6 F | HEART RATE: 92 BPM | RESPIRATION RATE: 16 BRPM | SYSTOLIC BLOOD PRESSURE: 124 MMHG | WEIGHT: 126 LBS | HEIGHT: 61 IN | DIASTOLIC BLOOD PRESSURE: 62 MMHG | BODY MASS INDEX: 23.79 KG/M2

## 2021-04-12 DIAGNOSIS — M85.89 OSTEOPENIA OF MULTIPLE SITES: ICD-10-CM

## 2021-04-12 DIAGNOSIS — R73.9 HYPERGLYCEMIA: ICD-10-CM

## 2021-04-12 DIAGNOSIS — Z00.00 PREVENTATIVE HEALTH CARE: ICD-10-CM

## 2021-04-12 DIAGNOSIS — Z91.81 HISTORY OF RECENT FALL: ICD-10-CM

## 2021-04-12 DIAGNOSIS — R73.09 ELEVATED HEMOGLOBIN A1C: ICD-10-CM

## 2021-04-12 DIAGNOSIS — N32.9 BLADDER DISORDER, UNSPECIFIED: ICD-10-CM

## 2021-04-12 DIAGNOSIS — J30.1 SEASONAL ALLERGIC RHINITIS DUE TO POLLEN: ICD-10-CM

## 2021-04-12 PROCEDURE — G0439 PPPS, SUBSEQ VISIT: HCPCS | Performed by: NURSE PRACTITIONER

## 2021-04-12 ASSESSMENT — ACTIVITIES OF DAILY LIVING (ADL): BATHING_REQUIRES_ASSISTANCE: 0

## 2021-04-12 ASSESSMENT — ENCOUNTER SYMPTOMS: GENERAL WELL-BEING: GOOD

## 2021-04-12 ASSESSMENT — PATIENT HEALTH QUESTIONNAIRE - PHQ9: CLINICAL INTERPRETATION OF PHQ2 SCORE: 0

## 2021-04-12 ASSESSMENT — FIBROSIS 4 INDEX: FIB4 SCORE: 1.63

## 2021-04-12 NOTE — PROGRESS NOTES
Chief Complaint   Patient presents with   • Medicare Annual Wellness         HPI:  Janet is a 81 y.o. here for Medicare Annual Wellness Visit    History of recent fall  2-3 months ago tripped over doggie bed.  Hit bottom, no injury.  Chiropractic help.      Allergic rhinitis  Continues with yearly visits with Dr. Winslow      Osteopenia  Taking liquid calcium and vitamin D       Patient Active Problem List    Diagnosis Date Noted   • Herpes zoster without complication 03/06/2019   • Bladder disorder, unspecified 03/06/2019   • Elevated hemoglobin A1c 03/06/2019   • Hyperglycemia 10/14/2015   • Osteopenia 09/25/2013   • OA (osteoarthritis) 10/26/2011   • Allergic rhinitis 10/26/2011   • Ringing in ears 05/28/2009       Current Outpatient Medications   Medication Sig Dispense Refill   • Cinnamon 500 MG Cap Take  by mouth.     • ibuprofen (MOTRIN) 200 MG Tab Take 200 mg by mouth every 6 hours as needed.     • Cholecalciferol (VITAMIN D3) 5000 UNITS Cap Take 1 Cap by mouth every day.     • ascorbic acid (ASCORBIC ACID) 500 MG Tab Take 500 mg by mouth every day.     • magnesium oxide (MAG-OX) 400 MG Tab Take 400 mg by mouth every day.     • B Complex Vitamins (B COMPLEX PO) Take  by mouth.     • Menaquinone-7 (VITAMIN K2 PO) Take  by mouth.     • Turmeric 500 MG Tab Take  by mouth.     • Omega-3 Fatty Acids (FISH OIL) 1000 MG CAPS capsule Take 1,000 mg by mouth 3 times a day, with meals.     • GLUCOSAMINE CHONDRO COMPLEX PO Take 2 Tabs by mouth every morning.       No current facility-administered medications for this visit.        Patient is taking medications as noted in medication list.  Current supplements as per medication list.     Allergies: Nkda [no known drug allergy]    Current social contact/activities: works at [employer]    Is patient current with immunizations? No, due for TDAP. Patient is interested in receiving NONE today.    She  reports that she has never smoked. She has never used smokeless  tobacco. She reports that she does not drink alcohol and does not use drugs.  Counseling given: Not Answered        DPA/Advanced directive: Patient has Advanced Directive on file.     ROS:    Gait: Uses no assistive device   Ostomy: No   Other tubes: No   Amputations: No   Chronic oxygen use No   Last eye exam 9/2020   Wears hearing aids: No   : Denies any urinary leakage during the last 6 months      Screening:    All screening reviewed by me    Depression Screening    Little interest or pleasure in doing things?  0 - not at all  Feeling down, depressed, or hopeless? 0 - not at all  Patient Health Questionnaire Score: 0    If depressive symptoms identified deferred to follow up visit unless specifically addressed in assessment and plan.    Interpretation of PHQ-9 Total Score   Score Severity   1-4 No Depression   5-9 Mild Depression   10-14 Moderate Depression   15-19 Moderately Severe Depression   20-27 Severe Depression    Screening for Cognitive Impairment    Three Minute Recall (captain, garden, picture)  3/3    Pérez clock face with all 12 numbers and set the hands to show 5 past 8.  Yes    If cognitive concerns identified, deferred for follow up unless specifically addressed in assessment and plan.    Fall Risk Assessment    Has the patient had two or more falls in the last year or any fall with injury in the last year?  Yes  If fall risk identified, deferred for follow up unless specifically addressed in assessment and plan.    Safety Assessment    Throw rugs on floor.  Yes  Handrails on all stairs.  Yes  Good lighting in all hallways.  Yes  Difficulty hearing.  No  Patient counseled about all safety risks that were identified.    Functional Assessment ADLs    Are there any barriers preventing you from cooking for yourself or meeting nutritional needs?  No.    Are there any barriers preventing you from driving safely or obtaining transportation?  No.    Are there any barriers preventing you from using a  telephone or calling for help?  No.    Are there any barriers preventing you from shopping?  No.    Are there any barriers preventing you from taking care of your own finances?  No.    Are there any barriers preventing you from managing your medications?  No.    Are there any barriers preventing you from showering, bathing or dressing yourself?  No.    Are you currently engaging in any exercise or physical activity?  Yes.     What is your perception of your health?  Good.    Health Maintenance Summary                IMM DTaP/Tdap/Td Vaccine Overdue 3/22/1959     IMM ZOSTER VACCINES Overdue 12/31/2014      Done 11/5/2014 Imm Admin: Zoster Vaccine Live (ZVL) (Zostavax) - HISTORICAL DATA    Annual Wellness Visit Overdue 3/9/2018      Done 3/8/2017 Visit Dx: Medicare annual wellness visit, subsequent     Patient has more history with this topic...    MAMMOGRAM Next Due 5/26/2021      Done 5/26/2020 MA-SCREENING MAMMO BILAT W/TOMOSYNTHESIS W/CAD     Patient has more history with this topic...    BONE DENSITY Next Due 4/24/2024      Done 4/24/2019 DS-BONE DENSITY STUDY (DEXA)     Patient has more history with this topic...          Patient Care Team:  RONNIE Blanco as PCP - General (Family Medicine)  Bipin Menjivar M.D. as Consulting Physician (Ophthalmology)  Matt Winslow M.D. as Consulting Physician (Allergy)  Brant Fernandez M.D. as Consulting Physician (Orthopaedics)  Yohan Sims M.D. as Consulting Physician (Otolaryngology)  WANDA MaoP.SHYAM as Consulting Physician (Podiatry)  [facility]  Dr. David Hauser, KASSIDYS (Dentistry)  [facility]    Social History     Tobacco Use   • Smoking status: Never Smoker   • Smokeless tobacco: Never Used   Substance Use Topics   • Alcohol use: No     Alcohol/week: 0.0 oz   • Drug use: No     Family History   Problem Relation Age of Onset   • Thyroid Mother    • Cancer Sister         ovarian     She  has a past medical history of  "Allergy, Arthritis, CATARACT, Osteopenia, Osteoporosis, and Right shoulder pain (8/12/2013).   Past Surgical History:   Procedure Laterality Date   • LOW ANTERIOR RESECTION LAPAROSCOPIC  9/4/2012    Performed by REGINO SRINIVASAN at SURGERY [facility] ORS   • UMBILICAL HERNIA REPAIR  9/17/2009    Performed by JUDITH LOZA at SURGERY [facility] ORS   • CATARACT EXTRACTION WITH IOL     • COLONOSCOPY     • ROTATOR CUFF REPAIR Right    • TONSILLECTOMY             Exam:     Ht 1.54 m (5' 0.63\")   Wt 57.2 kg (126 lb)  Body mass index is 24.1 kg/m².    Hearing good.    Dentition good  Alert, oriented in no acute distress.  Eye contact is good, speech goal directed, affect calm  CV:  Normal S1, S2, no murmur, no bruit, lungs clear    Assessment and Plan. The following treatment and monitoring plan is recommended:  1. History of recent fall  Acute issue.  No acute injury.  Has gotten relief with chiropractic care.      2. Elevated hemoglobin A1c  Chronic, ongoing. Due for labs.  Orders provided.  Discussed diet, sweets and exercise program.  Monitor and follow.   - HEMOGLOBIN A1C; Future    3. Hyperglycemia  See #2    4. Preventative health care  Due for updated labs.  Orders provided.  Monitor and foloow    - CBC WITH DIFFERENTIAL; Future  - Comp Metabolic Panel; Future  - Lipid Profile; Future    5. Seasonal allergic rhinitis due to pollen  Chronic, ongoing. Seeing allergy.  Discussed today meds, over the counter treatments.  Continue with allergy medications.  Monitor.     6. Osteopenia of multiple sites  Chronic, ongoing. Will be due for dexa next year.  Recent fall reported, without injury.  Continue with calcium, vit D3 and daily walking.         Services suggested: No services needed at this time  Health Care Screening recommendations as per orders if indicated.  Referrals offered: PT/OT/Nutrition counseling/Behavioral Health/Smoking cessation as per orders if indicated.    Discussion today about general " wellness and lifestyle habits:    · Prevent falls and reduce trip hazards; Cautioned about securing or removing rugs.  · Have a working fire alarm and carbon monoxide detector;   · Engage in regular physical activity and social activities       Follow-up: No follow-ups on file.

## 2021-05-05 ENCOUNTER — HOSPITAL ENCOUNTER (OUTPATIENT)
Dept: LAB | Facility: MEDICAL CENTER | Age: 81
End: 2021-05-05
Attending: NURSE PRACTITIONER
Payer: MEDICARE

## 2021-05-05 DIAGNOSIS — R73.09 ELEVATED HEMOGLOBIN A1C: ICD-10-CM

## 2021-05-05 DIAGNOSIS — Z00.00 PREVENTATIVE HEALTH CARE: ICD-10-CM

## 2021-05-05 LAB
ALBUMIN SERPL BCP-MCNC: 4.1 G/DL (ref 3.2–4.9)
ALBUMIN/GLOB SERPL: 1.6 G/DL
ALP SERPL-CCNC: 78 U/L (ref 30–99)
ALT SERPL-CCNC: 19 U/L (ref 2–50)
ANION GAP SERPL CALC-SCNC: 7 MMOL/L (ref 7–16)
AST SERPL-CCNC: 19 U/L (ref 12–45)
BASOPHILS # BLD AUTO: 1.7 % (ref 0–1.8)
BASOPHILS # BLD: 0.09 K/UL (ref 0–0.12)
BILIRUB SERPL-MCNC: 0.8 MG/DL (ref 0.1–1.5)
BUN SERPL-MCNC: 17 MG/DL (ref 8–22)
CALCIUM SERPL-MCNC: 9.1 MG/DL (ref 8.5–10.5)
CHLORIDE SERPL-SCNC: 106 MMOL/L (ref 96–112)
CHOLEST SERPL-MCNC: 204 MG/DL (ref 100–199)
CO2 SERPL-SCNC: 27 MMOL/L (ref 20–33)
CREAT SERPL-MCNC: 0.75 MG/DL (ref 0.5–1.4)
EOSINOPHIL # BLD AUTO: 0.16 K/UL (ref 0–0.51)
EOSINOPHIL NFR BLD: 2.9 % (ref 0–6.9)
ERYTHROCYTE [DISTWIDTH] IN BLOOD BY AUTOMATED COUNT: 43.7 FL (ref 35.9–50)
EST. AVERAGE GLUCOSE BLD GHB EST-MCNC: 128 MG/DL
FASTING STATUS PATIENT QL REPORTED: NORMAL
GLOBULIN SER CALC-MCNC: 2.6 G/DL (ref 1.9–3.5)
GLUCOSE SERPL-MCNC: 112 MG/DL (ref 65–99)
HBA1C MFR BLD: 6.1 % (ref 4–5.6)
HCT VFR BLD AUTO: 42.3 % (ref 37–47)
HDLC SERPL-MCNC: 73 MG/DL
HGB BLD-MCNC: 14 G/DL (ref 12–16)
IMM GRANULOCYTES # BLD AUTO: 0.01 K/UL (ref 0–0.11)
IMM GRANULOCYTES NFR BLD AUTO: 0.2 % (ref 0–0.9)
LDLC SERPL CALC-MCNC: 116 MG/DL
LYMPHOCYTES # BLD AUTO: 1.24 K/UL (ref 1–4.8)
LYMPHOCYTES NFR BLD: 22.8 % (ref 22–41)
MCH RBC QN AUTO: 30.8 PG (ref 27–33)
MCHC RBC AUTO-ENTMCNC: 33.1 G/DL (ref 33.6–35)
MCV RBC AUTO: 93 FL (ref 81.4–97.8)
MONOCYTES # BLD AUTO: 0.51 K/UL (ref 0–0.85)
MONOCYTES NFR BLD AUTO: 9.4 % (ref 0–13.4)
NEUTROPHILS # BLD AUTO: 3.43 K/UL (ref 2–7.15)
NEUTROPHILS NFR BLD: 63 % (ref 44–72)
NRBC # BLD AUTO: 0 K/UL
NRBC BLD-RTO: 0 /100 WBC
PLATELET # BLD AUTO: 266 K/UL (ref 164–446)
PMV BLD AUTO: 10.5 FL (ref 9–12.9)
POTASSIUM SERPL-SCNC: 5.2 MMOL/L (ref 3.6–5.5)
PROT SERPL-MCNC: 6.7 G/DL (ref 6–8.2)
RBC # BLD AUTO: 4.55 M/UL (ref 4.2–5.4)
SODIUM SERPL-SCNC: 140 MMOL/L (ref 135–145)
TRIGL SERPL-MCNC: 75 MG/DL (ref 0–149)
WBC # BLD AUTO: 5.4 K/UL (ref 4.8–10.8)

## 2021-05-05 PROCEDURE — 85025 COMPLETE CBC W/AUTO DIFF WBC: CPT

## 2021-05-05 PROCEDURE — 83036 HEMOGLOBIN GLYCOSYLATED A1C: CPT | Mod: GA

## 2021-05-05 PROCEDURE — 80053 COMPREHEN METABOLIC PANEL: CPT

## 2021-05-05 PROCEDURE — 36415 COLL VENOUS BLD VENIPUNCTURE: CPT | Mod: GA

## 2021-05-05 PROCEDURE — 80061 LIPID PANEL: CPT | Mod: GA

## 2021-08-11 ENCOUNTER — OFFICE VISIT (OUTPATIENT)
Dept: MEDICAL GROUP | Facility: PHYSICIAN GROUP | Age: 81
End: 2021-08-11
Payer: MEDICARE

## 2021-08-11 VITALS
TEMPERATURE: 98.4 F | DIASTOLIC BLOOD PRESSURE: 48 MMHG | HEIGHT: 60 IN | OXYGEN SATURATION: 96 % | WEIGHT: 129 LBS | BODY MASS INDEX: 25.32 KG/M2 | RESPIRATION RATE: 16 BRPM | SYSTOLIC BLOOD PRESSURE: 138 MMHG | HEART RATE: 90 BPM

## 2021-08-11 DIAGNOSIS — Z91.81 HISTORY OF RECENT FALL: ICD-10-CM

## 2021-08-11 DIAGNOSIS — R73.09 ELEVATED HEMOGLOBIN A1C: ICD-10-CM

## 2021-08-11 DIAGNOSIS — Z12.31 ENCOUNTER FOR SCREENING MAMMOGRAM FOR MALIGNANT NEOPLASM OF BREAST: ICD-10-CM

## 2021-08-11 DIAGNOSIS — H02.9 EYELID LESION: ICD-10-CM

## 2021-08-11 PROCEDURE — 99213 OFFICE O/P EST LOW 20 MIN: CPT | Performed by: NURSE PRACTITIONER

## 2021-08-11 ASSESSMENT — FIBROSIS 4 INDEX: FIB4 SCORE: 1.29

## 2021-08-11 NOTE — ASSESSMENT & PLAN NOTE
Reports 2-3 weeks ago fell in [employer], slipped and landed on knee and buttocks.  Slipped on hangar.  Recent fell over chair at [employer], no injury.  Seeing chirporactic.

## 2021-08-11 NOTE — PROGRESS NOTES
Chief Complaint   Patient presents with   • Skin Lesion     side of right eye        Subjective:   Janet Hummel is a 81 y.o. female here today for evaluation and management of:    History of recent fall  Reports 2-3 weeks ago fell in [employer], slipped and landed on knee and buttocks.  Slipped on hangar.  Recent fell over chair at [employer], no injury.  Seeing chirporactic.     Eyelid lesion  Dry, flaky lesions X2 on right outer canthus of eye.  No itching, no bleeding reported.  Has been putting aloe Vera on it.      Elevated hemoglobin A1c  Last A1c 6.1 in May 2021.  Discussed portion control, walking, weight loss and decreasing candy.          Current medicines (including changes today)  Current Outpatient Medications   Medication Sig Dispense Refill   • Cinnamon 500 MG Cap Take  by mouth.     • ibuprofen (MOTRIN) 200 MG Tab Take 200 mg by mouth every 6 hours as needed.     • Cholecalciferol (VITAMIN D3) 5000 UNITS Cap Take 1 Cap by mouth every day.     • ascorbic acid (ASCORBIC ACID) 500 MG Tab Take 500 mg by mouth every day.     • magnesium oxide (MAG-OX) 400 MG Tab Take 400 mg by mouth every day.     • B Complex Vitamins (B COMPLEX PO) Take  by mouth.     • Menaquinone-7 (VITAMIN K2 PO) Take  by mouth.     • Turmeric 500 MG Tab Take  by mouth.     • Omega-3 Fatty Acids (FISH OIL) 1000 MG CAPS capsule Take 1,000 mg by mouth 3 times a day, with meals.     • GLUCOSAMINE CHONDRO COMPLEX PO Take 2 Tabs by mouth every morning.       No current facility-administered medications for this visit.     She  has a past medical history of Allergy, Arthritis, CATARACT, Osteopenia, Osteoporosis, and Right shoulder pain (8/12/2013).    ROS as stated in hpi  No chest pain, no shortness of breath, no abdominal pain       Objective:     /48   Pulse 90   Temp 36.9 °C (98.4 °F) (Temporal)   Resp 16   Ht 1.524 m (5')   Wt 58.5 kg (129 lb)   SpO2 96%  Body mass index is 25.19 kg/m².   Physical  Exam:  Constitutional: Alert, no distress.  Skin: Warm, dry, good turgor,no cyanosis, raised dry patch on right outer lid, 2 small lesions 3mm in size, no bleeding or exudate noted. Eye: Equal, round and reactive, conjunctiva clear, lids normal.  Neck: Trachea midline, no masses, no obvious thyroid enlargement.. No cervical or supraclavicular lymphadenopathy. Range of motion within normal limits.  Neuro: Cranial nerves 2-12 grossly intact.  No sensory deficit.  Respiratory: Unlabored respiratory effort,Cardiovascular: Normal S1, S2, no murmur, no edema.  Psych: Alert and oriented x3, normal affect and mood and judgement.        Assessment and Plan:   The following treatment plan was discussed    1. Eyelid lesion  Acute issue, non complex.  Suspect some eczema.  OTC hydrocortisone bid. Good moisturizer.  Discontinue any new eye products.  Monitor and follow.  May need to follow up with her derm.     2. Encounter for screening mammogram for malignant neoplasm of breast  Due for screening.  Order provided.  Monitor and follow.   - MA-SCREENING MAMMO BILAT W/CAD; Future    3. History of recent fall  Acute issue with 3 falls this year.  Non-injuries.  Consider PT referral for balance.  Encouraged patient to slow down a bit and look before she moves.  Monitor.     4. Elevated hemoglobin A1c  Chronic, ongoing. A1c at 6.1.  Discussed foods, portions and decreasing sugars.  monitor      Followup: No follow-ups on file.         Educated in proper administration of medication(s) ordered today including safety, possible SE, risks, benefits, rationale and alternatives to therapy.     Please note that this dictation was created using voice recognition software. I have made every reasonable attempt to correct obvious errors, but I expect that there are errors of grammar and possibly content that I did not discover before finalizing the note.

## 2021-08-11 NOTE — ASSESSMENT & PLAN NOTE
Dry, flaky lesions X2 on right outer canthus of eye.  No itching, no bleeding reported.  Has been putting aloe Vera on it.

## 2021-09-17 ENCOUNTER — HOSPITAL ENCOUNTER (OUTPATIENT)
Dept: RADIOLOGY | Facility: MEDICAL CENTER | Age: 81
End: 2021-09-17
Attending: NURSE PRACTITIONER
Payer: MEDICARE

## 2021-09-17 DIAGNOSIS — Z12.31 ENCOUNTER FOR SCREENING MAMMOGRAM FOR MALIGNANT NEOPLASM OF BREAST: ICD-10-CM

## 2021-09-17 PROCEDURE — 77063 BREAST TOMOSYNTHESIS BI: CPT

## 2021-10-18 ENCOUNTER — OFFICE VISIT (OUTPATIENT)
Dept: URGENT CARE | Facility: PHYSICIAN GROUP | Age: 81
End: 2021-10-18
Payer: MEDICARE

## 2021-10-18 VITALS
HEART RATE: 84 BPM | OXYGEN SATURATION: 96 % | DIASTOLIC BLOOD PRESSURE: 68 MMHG | SYSTOLIC BLOOD PRESSURE: 178 MMHG | TEMPERATURE: 98.1 F | RESPIRATION RATE: 16 BRPM | HEIGHT: 60 IN | BODY MASS INDEX: 25.13 KG/M2 | WEIGHT: 128 LBS

## 2021-10-18 DIAGNOSIS — M53.80 BACK TIGHTNESS: ICD-10-CM

## 2021-10-18 DIAGNOSIS — R03.0 ELEVATED BLOOD PRESSURE READING: ICD-10-CM

## 2021-10-18 DIAGNOSIS — Z65.9 OTHER SOCIAL STRESSOR: ICD-10-CM

## 2021-10-18 PROCEDURE — 99214 OFFICE O/P EST MOD 30 MIN: CPT | Performed by: NURSE PRACTITIONER

## 2021-10-18 RX ORDER — ALBUTEROL SULFATE 90 UG/1
AEROSOL, METERED RESPIRATORY (INHALATION)
COMMUNITY
Start: 2021-08-31

## 2021-10-18 ASSESSMENT — ENCOUNTER SYMPTOMS
PALPITATIONS: 0
NECK PAIN: 0
VOMITING: 0
BACK PAIN: 1
DIARRHEA: 0
TREMORS: 0
SORE THROAT: 0
SENSORY CHANGE: 0
SHORTNESS OF BREATH: 0
TINGLING: 0
NAUSEA: 0
FEVER: 0
COUGH: 0
MYALGIAS: 0
CLAUDICATION: 0
DOUBLE VISION: 0
FOCAL WEAKNESS: 0
DIZZINESS: 0
BLURRED VISION: 0
HEADACHES: 0
WHEEZING: 0

## 2021-10-18 ASSESSMENT — FIBROSIS 4 INDEX: FIB4 SCORE: 1.29

## 2021-10-18 ASSESSMENT — LIFESTYLE VARIABLES: SUBSTANCE_ABUSE: 0

## 2021-10-18 NOTE — PROGRESS NOTES
"Janet Hummel is a 81 y.o. female who presents for Hypertension (backache, no dizziness or headache, x2 weeks. )      HPI this new problem.  Janet is a 81-year-old female presents with high blood pressure.  She has associated backache between her shoulder blades.  It feels like a tightness.  She denies dizziness, vision change, headache.  Her blood pressures have gone up and down for years.  She thinks it has been a little higher over the past few weeks.  She was recently seen by a specialist and her dentist who commented that her blood pressure was mildly elevated. She has never taken medication to control her blood pressure.  She tried to get in with her primary care provider but was told there were no appointments available and that her primary care provider would be leaving in December.  She has multiple social stressors.  She has a son and grandson living with her.  She has a daughter with  Autism who lives on her own but is very dependent on her mother.  Valentina states that she is just having more things to think about caring for her daughter as well as now her son and grandson.  She reports that they try to be very helpful but she is worrying about if they are eating or not.  She is having to cook more than she normally does.  Her housework is increased.  This is also causing her some level of stress.  She denies history of anxiety but reports that she can get \"worked up\".    Review of Systems   Constitutional: Negative for fever and malaise/fatigue.   HENT: Negative for congestion, ear pain and sore throat.    Eyes: Negative for blurred vision and double vision.   Respiratory: Negative for cough, shortness of breath and wheezing.    Cardiovascular: Negative for chest pain, palpitations, claudication and leg swelling.   Gastrointestinal: Negative for diarrhea, nausea and vomiting.   Musculoskeletal: Positive for back pain. Negative for myalgias and neck pain.   Neurological: Negative for dizziness, " tingling, tremors, sensory change, focal weakness and headaches.   Endo/Heme/Allergies: Negative for environmental allergies.   Psychiatric/Behavioral: Negative for substance abuse.       Allergies:       Allergies   Allergen Reactions   • Nkda [No Known Drug Allergy]        PMSFS Hx:  Past Medical History:   Diagnosis Date   • Allergy    • Arthritis     thumb   • CATARACT     IOL beatris   • Osteopenia    • Osteoporosis    • Right shoulder pain 8/12/2013     Past Surgical History:   Procedure Laterality Date   • LOW ANTERIOR RESECTION LAPAROSCOPIC  9/4/2012    Performed by REGINO SRINIVASAN at SURGERY [facility] ORS   • UMBILICAL HERNIA REPAIR  9/17/2009    Performed by JUDITH LOZA at SURGERY [facility] ORS   • CATARACT EXTRACTION WITH IOL     • COLONOSCOPY     • ROTATOR CUFF REPAIR Right    • TONSILLECTOMY       Family History   Problem Relation Age of Onset   • Thyroid Mother    • Cancer Sister         ovarian     Social History     Tobacco Use   • Smoking status: Never Smoker   • Smokeless tobacco: Never Used   Substance Use Topics   • Alcohol use: No     Alcohol/week: 0.0 oz       Problems:   Patient Active Problem List   Diagnosis   • Ringing in ears   • OA (osteoarthritis)   • Allergic rhinitis   • Osteopenia   • Hyperglycemia   • Herpes zoster without complication   • Bladder disorder, unspecified   • Elevated hemoglobin A1c   • History of recent fall   • Eyelid lesion       Medications:   Current Outpatient Medications on File Prior to Visit   Medication Sig Dispense Refill   • Cinnamon 500 MG Cap Take  by mouth.     • ibuprofen (MOTRIN) 200 MG Tab Take 200 mg by mouth every 6 hours as needed.     • Cholecalciferol (VITAMIN D3) 5000 UNITS Cap Take 1 Cap by mouth every day.     • ascorbic acid (ASCORBIC ACID) 500 MG Tab Take 500 mg by mouth every day.     • magnesium oxide (MAG-OX) 400 MG Tab Take 400 mg by mouth every day.     • B Complex Vitamins (B COMPLEX PO) Take  by mouth.     • Menaquinone-7  (VITAMIN K2 PO) Take  by mouth.     • Turmeric 500 MG Tab Take  by mouth.     • Omega-3 Fatty Acids (FISH OIL) 1000 MG CAPS capsule Take 1,000 mg by mouth 3 times a day, with meals.     • GLUCOSAMINE CHONDRO COMPLEX PO Take 2 Tabs by mouth every morning.       No current facility-administered medications on file prior to visit.          Objective:     BP (!) 178/68   Pulse 84   Temp 36.7 °C (98.1 °F) (Temporal)   Resp 16   Ht 1.524 m (5')   Wt 58.1 kg (128 lb)   LMP 01/01/1996   SpO2 96%   BMI 25.00 kg/m²     Physical Exam  Vitals and nursing note reviewed.   Constitutional:       General: She is not in acute distress.     Appearance: Normal appearance. She is normal weight. She is not toxic-appearing.   HENT:      Head: Normocephalic.      Mouth/Throat:      Mouth: Mucous membranes are moist.   Eyes:      Pupils: Pupils are equal, round, and reactive to light.   Cardiovascular:      Rate and Rhythm: Normal rate and regular rhythm.      Pulses: Normal pulses.      Heart sounds: Normal heart sounds.   Pulmonary:      Effort: Pulmonary effort is normal. No accessory muscle usage.      Breath sounds: Normal breath sounds and air entry.   Musculoskeletal:      Cervical back: Normal range of motion and neck supple.      Right lower leg: No edema.      Left lower leg: No edema.   Skin:     General: Skin is warm.      Capillary Refill: Capillary refill takes less than 2 seconds.   Neurological:      Mental Status: She is alert and oriented to person, place, and time.   Psychiatric:         Mood and Affect: Mood normal.         Behavior: Behavior normal.         Thought Content: Thought content normal.     EKG Interpretation    Interpreted by me    Rhythm: normal sinus   Rate: normal  Axis: normal  Ectopy: none  Conduction: normal  ST Segments: no acute change  T Waves: no acute change  Q Waves: none    Clinical Impression: non-specific EKG- mild  hypertrophy changes         Assessment /Associated Orders:      1.  Elevated blood pressure reading  EKG - Clinic Performed    AMB REFERRAL BACK TO [facility] PCP   2. Back tightness  EKG - Clinic Performed    AMB REFERRAL BACK TO [facility] PCP   3. Other social stressor  EKG - Clinic Performed    AMB REFERRAL BACK TO [facility] PCP       Medical Decision Making:    Pt is clinically stable at today's acute urgent care visit.  No acute distress noted. Appropriate for outpatient management at this time.   Acute problem today with uncertain prognosis.   Educated in relaxation techniques  Ambulatory blood pressure monitoring 2-3 times a day.  Have advised her to keep a log with the following information, date, time, blood pressure reading, heart rate, and notes about how she is feeling.  I have advised her to take the blood pressure reading into her primary care appointment.  We have scheduled her primary care appointment with her current PCP Chely Patton on October 27, 2021 and then with Milagros Larios NP on November 24, 2021 follow-up from the urgent care visit today.  She should seek emergency room care if she should have chest pain, shortness of breath, dizziness, vision change, focal weakness, blurred vision or if her blood pressure is greater than 200/100.    Advised to follow-up with the primary care provider for recheck, reevaluation, and consideration of further management if necessary.   Discussed management options (risks,benefits, and alternatives to treatment). Expressed understanding and the treatment plan was agreed upon. Questions were encouraged and answered   Return to urgent care prn if new or worsening sx or if there is no improvement in condition prn.    Educated in Red flags and indications to immediately call 911 or present to the Emergency Department.     I personally reviewed prior external notes and test results pertinent to today's visit.  I have independently reviewed and interpreted all diagnostics ordered during this urgent care acute visit.   Time spent  evaluating this patient was at least 30 minutes and includes preparing for visit, counseling/education, exam and evaluation, obtaining history, independent interpretation, ordering lab/test/procedures,medication management and documentation.Time does not include separately billable procedures noted .

## 2021-11-01 ENCOUNTER — OFFICE VISIT (OUTPATIENT)
Dept: MEDICAL GROUP | Facility: PHYSICIAN GROUP | Age: 81
End: 2021-11-01
Payer: MEDICARE

## 2021-11-01 VITALS
DIASTOLIC BLOOD PRESSURE: 50 MMHG | HEIGHT: 60 IN | TEMPERATURE: 97.9 F | OXYGEN SATURATION: 97 % | RESPIRATION RATE: 16 BRPM | WEIGHT: 127 LBS | BODY MASS INDEX: 24.94 KG/M2 | HEART RATE: 80 BPM | SYSTOLIC BLOOD PRESSURE: 130 MMHG

## 2021-11-01 DIAGNOSIS — M85.89 OSTEOPENIA OF MULTIPLE SITES: ICD-10-CM

## 2021-11-01 DIAGNOSIS — N32.9 BLADDER DISORDER, UNSPECIFIED: ICD-10-CM

## 2021-11-01 DIAGNOSIS — R03.0 ELEVATED BLOOD PRESSURE READING: ICD-10-CM

## 2021-11-01 PROCEDURE — 99212 OFFICE O/P EST SF 10 MIN: CPT | Performed by: NURSE PRACTITIONER

## 2021-11-01 RX ORDER — AMOXICILLIN 500 MG/1
500 CAPSULE ORAL
COMMUNITY
Start: 2021-11-01 | End: 2021-11-08

## 2021-11-01 ASSESSMENT — FIBROSIS 4 INDEX: FIB4 SCORE: 1.29

## 2021-11-01 NOTE — PROGRESS NOTES
Chief Complaint   Patient presents with   • Follow-Up     was seen in UC   • Hypertension       Subjective:   Janet Hummel is a 81 y.o. female here today for evaluation and management of:    Elevated blood pressure reading  bp today 130/50.  Has some situational elevations at dentist and urgent care.  Not currently walking.  Just had dental extractions.  No chest pain.  Feeling stressed at home.      Bladder disorder, unspecified  Seeing Dr. Moore.  Using a pessary and followed by her.    Osteopenia  Currently taking vitamin D, drinking milk and milk products.  No recent falls reported.          Current medicines (including changes today)  Current Outpatient Medications   Medication Sig Dispense Refill   • albuterol 108 (90 Base) MCG/ACT Aero Soln inhalation aerosol      • Cinnamon 500 MG Cap Take  by mouth.     • ibuprofen (MOTRIN) 200 MG Tab Take 200 mg by mouth every 6 hours as needed.     • Cholecalciferol (VITAMIN D3) 5000 UNITS Cap Take 1 Cap by mouth every day.     • ascorbic acid (ASCORBIC ACID) 500 MG Tab Take 500 mg by mouth every day.     • magnesium oxide (MAG-OX) 400 MG Tab Take 400 mg by mouth every day.     • B Complex Vitamins (B COMPLEX PO) Take  by mouth.     • Menaquinone-7 (VITAMIN K2 PO) Take  by mouth.     • Turmeric 500 MG Tab Take  by mouth.     • Omega-3 Fatty Acids (FISH OIL) 1000 MG CAPS capsule Take 1,000 mg by mouth 3 times a day, with meals.     • GLUCOSAMINE CHONDRO COMPLEX PO Take 2 Tabs by mouth every morning.       No current facility-administered medications for this visit.     She  has a past medical history of Allergy, Arthritis, CATARACT, Osteopenia, Osteoporosis, and Right shoulder pain (8/12/2013).    ROS as stated in hpi  No chest pain, no shortness of breath, no abdominal pain       Objective:     /50 (BP Location: Left arm, Patient Position: Sitting)   Pulse 80   Temp 36.6 °C (97.9 °F) (Temporal)   Resp 16   Ht 1.524 m (5')   Wt 57.6 kg (127 lb)    SpO2 97%  Body mass index is 24.8 kg/m².   Physical Exam:  Constitutional: Alert, no distress.  Skin: Warm, dry, good turgor,no cyanosis, no rashes in visible areas.  Eye: Equal, round and reactive, conjunctiva clear, lids normal.  Neck: Trachea midline, no masses, no obvious thyroid enlargement.. No cervical or supraclavicular lymphadenopathy. Range of motion within normal limits.  Neuro: Cranial nerves 2-12 grossly intact.  No sensory deficit.  Respiratory: Unlabored respiratory effort, lungs clear to auscultation, no wheezes, no ronchi.  Cardiovascular: Normal S1, S2, no murmur, no edema.    Psych: Alert and oriented x3, normal affect and mood and judgement.        Assessment and Plan:   The following treatment plan was discussed    1. Elevated blood pressure reading  Acute issue noted at dentist and urgent care.  BP log has been in the normal range.  At goal today at clinic.  Discussed exercise to help with stress relief.  Monitor once daily for the next two weeks and let me know.  Avoid added salt.  Monitor and follow.     2. Bladder disorder, unspecified  Chronic, ongoing, prolapse followed by Dr. Rivera.  Treated with pessary.  Stable.     3. Osteopenia of multiple sites  Chronic,ongoing. Stable.  Continue calcium and vit D.  Discussed fall risks.  Encouraged walking.       Followup: No follow-ups on file.         Educated in proper administration of medication(s) ordered today including safety, possible SE, risks, benefits, rationale and alternatives to therapy.     Please note that this dictation was created using voice recognition software. I have made every reasonable attempt to correct obvious errors, but I expect that there are errors of grammar and possibly content that I did not discover before finalizing the note.

## 2021-11-01 NOTE — ASSESSMENT & PLAN NOTE
bp today 130/50.  Has some situational elevations at dentist and urgent care.  Not currently walking.  Just had dental extractions.  No chest pain.  Feeling stressed at home.

## 2022-04-08 ENCOUNTER — HOSPITAL ENCOUNTER (OUTPATIENT)
Dept: RADIOLOGY | Facility: MEDICAL CENTER | Age: 82
End: 2022-04-08
Attending: NURSE PRACTITIONER
Payer: MEDICARE

## 2022-04-08 ENCOUNTER — OFFICE VISIT (OUTPATIENT)
Dept: URGENT CARE | Facility: PHYSICIAN GROUP | Age: 82
End: 2022-04-08
Payer: MEDICARE

## 2022-04-08 VITALS
HEIGHT: 61 IN | OXYGEN SATURATION: 98 % | RESPIRATION RATE: 16 BRPM | WEIGHT: 128 LBS | TEMPERATURE: 98.2 F | HEART RATE: 82 BPM | BODY MASS INDEX: 24.17 KG/M2 | SYSTOLIC BLOOD PRESSURE: 122 MMHG | DIASTOLIC BLOOD PRESSURE: 68 MMHG

## 2022-04-08 DIAGNOSIS — R07.89 CHEST WALL PAIN: ICD-10-CM

## 2022-04-08 DIAGNOSIS — S29.012A UPPER BACK STRAIN, INITIAL ENCOUNTER: ICD-10-CM

## 2022-04-08 PROCEDURE — 71101 X-RAY EXAM UNILAT RIBS/CHEST: CPT | Mod: RT

## 2022-04-08 PROCEDURE — 99213 OFFICE O/P EST LOW 20 MIN: CPT | Performed by: NURSE PRACTITIONER

## 2022-04-08 RX ORDER — CYCLOBENZAPRINE HCL 5 MG
5 TABLET ORAL 3 TIMES DAILY PRN
Qty: 20 TABLET | Refills: 0 | Status: SHIPPED | OUTPATIENT
Start: 2022-04-08

## 2022-04-08 ASSESSMENT — FIBROSIS 4 INDEX: FIB4 SCORE: 1.31

## 2022-04-08 ASSESSMENT — ENCOUNTER SYMPTOMS
MUSCULOSKELETAL PAIN: 1
BACK PAIN: 1

## 2022-04-08 NOTE — PROGRESS NOTES
Subjective     Janet Hummel is a 82 y.o. female who presents with Muscle Pain (Hurt back last Friday, last night she reached up and felt like it was pulled.)    Past Medical History:   Diagnosis Date   • Allergy    • Arthritis     thumb   • CATARACT     IOL beatris   • Osteopenia    • Osteoporosis    • Right shoulder pain 8/12/2013     Social History     Socioeconomic History   • Marital status:      Spouse name: Not on file   • Number of children: Not on file   • Years of education: Not on file   • Highest education level: Not on file   Occupational History   • Not on file   Tobacco Use   • Smoking status: Never Smoker   • Smokeless tobacco: Never Used   Vaping Use   • Vaping Use: Never used   Substance and Sexual Activity   • Alcohol use: No     Alcohol/week: 0.0 oz   • Drug use: No   • Sexual activity: Never     Partners: Male   Other Topics Concern   • Not on file   Social History Narrative   • Not on file     Social Determinants of Health     Financial Resource Strain: Not on file   Food Insecurity: Not on file   Transportation Needs: Not on file   Physical Activity: Not on file   Stress: Not on file   Social Connections: Not on file   Intimate Partner Violence: Not on file   Housing Stability: Not on file     Family History   Problem Relation Age of Onset   • Thyroid Mother    • Cancer Sister         ovarian       Allergies: Nkda [no known drug allergy]    This patient is an 82-year-old female who presents today with complaint of pain to the posterior right chest wall.  Patient states last night she reached overhead to click a locking mechanism on her sliding glass door.  When she reached overhead she felt a sharp pain and a pop in the right upper back.  States that she also injured this area about 1 week ago.  She states that injury had resolved but then she reinjured it last night.  Patient endorses pain with range of motion.  No pain with deep inspiration.  No shortness of breath or  "hemoptysis.          Muscle Pain  This is a new problem. The current episode started in the past 7 days. The problem occurs constantly. The problem has been unchanged. Nothing aggravates the symptoms. She has tried nothing for the symptoms. The treatment provided no relief.       Review of Systems   Musculoskeletal: Positive for back pain.   All other systems reviewed and are negative.             Objective     /68   Pulse 82   Temp 36.8 °C (98.2 °F)   Resp 16   Ht 1.549 m (5' 1\")   Wt 58.1 kg (128 lb)   LMP 01/01/1996   SpO2 98%   BMI 24.19 kg/m²      Physical Exam  Vitals reviewed.   Constitutional:       Appearance: Normal appearance.   Skin:     General: Skin is warm and dry.   Neurological:      General: No focal deficit present.      Mental Status: She is alert and oriented to person, place, and time.   Psychiatric:         Mood and Affect: Mood normal.         Behavior: Behavior normal.       There is point tenderness to the right side of the back just below the scapula.  Pain is localized.  No point tenderness to the mid axillary line or the anterior chest wall.  Lungs are clear to auscultation bilaterally        X-ray, ribs:    IMPRESSION:     1.  There is no acute displaced right rib fracture.             Assessment & Plan   Muscle strain of the right posterior chest wall    Ibuprofen  Flexeril 5 mg every 8 hours as needed  Alternate ice and heat  Follow-up for persistent or worsening of symptoms  Follow-up immediately for shortness of breath or hemoptysis     There are no diagnoses linked to this encounter.              "

## 2022-06-08 ENCOUNTER — HOSPITAL ENCOUNTER (OUTPATIENT)
Dept: RADIOLOGY | Facility: MEDICAL CENTER | Age: 82
End: 2022-06-08
Attending: NURSE PRACTITIONER
Payer: MEDICARE

## 2022-06-08 ENCOUNTER — HOSPITAL ENCOUNTER (OUTPATIENT)
Dept: LAB | Facility: MEDICAL CENTER | Age: 82
End: 2022-06-08
Attending: NURSE PRACTITIONER
Payer: MEDICARE

## 2022-06-08 DIAGNOSIS — M85.89 OSTEOPENIA OF MULTIPLE SITES: ICD-10-CM

## 2022-06-08 DIAGNOSIS — Z78.0 POSTMENOPAUSAL: ICD-10-CM

## 2022-06-08 LAB
ALBUMIN SERPL BCP-MCNC: 4.4 G/DL (ref 3.2–4.9)
ALBUMIN/GLOB SERPL: 1.8 G/DL
ALP SERPL-CCNC: 74 U/L (ref 30–99)
ALT SERPL-CCNC: 17 U/L (ref 2–50)
ANION GAP SERPL CALC-SCNC: 10 MMOL/L (ref 7–16)
AST SERPL-CCNC: 19 U/L (ref 12–45)
BASOPHILS # BLD AUTO: 1.6 % (ref 0–1.8)
BASOPHILS # BLD: 0.07 K/UL (ref 0–0.12)
BILIRUB SERPL-MCNC: 0.9 MG/DL (ref 0.1–1.5)
BUN SERPL-MCNC: 16 MG/DL (ref 8–22)
CALCIUM SERPL-MCNC: 8.9 MG/DL (ref 8.5–10.5)
CHLORIDE SERPL-SCNC: 106 MMOL/L (ref 96–112)
CHOLEST SERPL-MCNC: 221 MG/DL (ref 100–199)
CO2 SERPL-SCNC: 26 MMOL/L (ref 20–33)
CREAT SERPL-MCNC: 0.63 MG/DL (ref 0.5–1.4)
EOSINOPHIL # BLD AUTO: 0.12 K/UL (ref 0–0.51)
EOSINOPHIL NFR BLD: 2.8 % (ref 0–6.9)
ERYTHROCYTE [DISTWIDTH] IN BLOOD BY AUTOMATED COUNT: 43.1 FL (ref 35.9–50)
EST. AVERAGE GLUCOSE BLD GHB EST-MCNC: 134 MG/DL
FASTING STATUS PATIENT QL REPORTED: NORMAL
GFR SERPLBLD CREATININE-BSD FMLA CKD-EPI: 90 ML/MIN/1.73 M 2
GLOBULIN SER CALC-MCNC: 2.4 G/DL (ref 1.9–3.5)
GLUCOSE SERPL-MCNC: 107 MG/DL (ref 65–99)
HBA1C MFR BLD: 6.3 % (ref 4–5.6)
HCT VFR BLD AUTO: 41.2 % (ref 37–47)
HDLC SERPL-MCNC: 79 MG/DL
HGB BLD-MCNC: 13.7 G/DL (ref 12–16)
IMM GRANULOCYTES # BLD AUTO: 0.01 K/UL (ref 0–0.11)
IMM GRANULOCYTES NFR BLD AUTO: 0.2 % (ref 0–0.9)
LDLC SERPL CALC-MCNC: 127 MG/DL
LYMPHOCYTES # BLD AUTO: 0.88 K/UL (ref 1–4.8)
LYMPHOCYTES NFR BLD: 20.7 % (ref 22–41)
MCH RBC QN AUTO: 30.4 PG (ref 27–33)
MCHC RBC AUTO-ENTMCNC: 33.3 G/DL (ref 33.6–35)
MCV RBC AUTO: 91.4 FL (ref 81.4–97.8)
MONOCYTES # BLD AUTO: 0.37 K/UL (ref 0–0.85)
MONOCYTES NFR BLD AUTO: 8.7 % (ref 0–13.4)
NEUTROPHILS # BLD AUTO: 2.81 K/UL (ref 2–7.15)
NEUTROPHILS NFR BLD: 66 % (ref 44–72)
NRBC # BLD AUTO: 0 K/UL
NRBC BLD-RTO: 0 /100 WBC
PLATELET # BLD AUTO: 271 K/UL (ref 164–446)
PMV BLD AUTO: 9.9 FL (ref 9–12.9)
POTASSIUM SERPL-SCNC: 4.2 MMOL/L (ref 3.6–5.5)
PROT SERPL-MCNC: 6.8 G/DL (ref 6–8.2)
RBC # BLD AUTO: 4.51 M/UL (ref 4.2–5.4)
SODIUM SERPL-SCNC: 142 MMOL/L (ref 135–145)
TRIGL SERPL-MCNC: 73 MG/DL (ref 0–149)
WBC # BLD AUTO: 4.3 K/UL (ref 4.8–10.8)

## 2022-06-08 PROCEDURE — 83036 HEMOGLOBIN GLYCOSYLATED A1C: CPT | Mod: GA

## 2022-06-08 PROCEDURE — 80053 COMPREHEN METABOLIC PANEL: CPT

## 2022-06-08 PROCEDURE — 36415 COLL VENOUS BLD VENIPUNCTURE: CPT | Mod: GA

## 2022-06-08 PROCEDURE — 80061 LIPID PANEL: CPT

## 2022-06-08 PROCEDURE — 85025 COMPLETE CBC W/AUTO DIFF WBC: CPT

## 2022-06-08 PROCEDURE — 77080 DXA BONE DENSITY AXIAL: CPT

## 2022-09-19 ENCOUNTER — HOSPITAL ENCOUNTER (OUTPATIENT)
Dept: RADIOLOGY | Facility: MEDICAL CENTER | Age: 82
End: 2022-09-19
Attending: NURSE PRACTITIONER
Payer: MEDICARE

## 2022-09-19 DIAGNOSIS — Z12.31 VISIT FOR SCREENING MAMMOGRAM: ICD-10-CM

## 2022-09-19 PROCEDURE — 77063 BREAST TOMOSYNTHESIS BI: CPT

## 2022-11-03 ENCOUNTER — PATIENT MESSAGE (OUTPATIENT)
Dept: HEALTH INFORMATION MANAGEMENT | Facility: OTHER | Age: 82
End: 2022-11-03

## 2023-07-15 ENCOUNTER — APPOINTMENT (OUTPATIENT)
Dept: RADIOLOGY | Facility: MEDICAL CENTER | Age: 83
End: 2023-07-15
Attending: NURSE PRACTITIONER
Payer: MEDICARE

## 2023-11-29 ENCOUNTER — PATIENT MESSAGE (OUTPATIENT)
Dept: HEALTH INFORMATION MANAGEMENT | Facility: OTHER | Age: 83
End: 2023-11-29